# Patient Record
Sex: MALE | Race: WHITE | NOT HISPANIC OR LATINO | Employment: FULL TIME | URBAN - METROPOLITAN AREA
[De-identification: names, ages, dates, MRNs, and addresses within clinical notes are randomized per-mention and may not be internally consistent; named-entity substitution may affect disease eponyms.]

---

## 2017-01-16 DIAGNOSIS — I10 ESSENTIAL HYPERTENSION: ICD-10-CM

## 2017-01-16 RX ORDER — ATORVASTATIN CALCIUM 10 MG/1
10 TABLET, FILM COATED ORAL DAILY
Qty: 30 TAB | Refills: 3 | Status: SHIPPED | OUTPATIENT
Start: 2017-01-16 | End: 2017-01-19 | Stop reason: SDUPTHER

## 2017-01-16 RX ORDER — LISINOPRIL AND HYDROCHLOROTHIAZIDE 12.5; 1 MG/1; MG/1
1 TABLET ORAL DAILY
Qty: 30 TAB | Refills: 3 | Status: SHIPPED | OUTPATIENT
Start: 2017-01-16 | End: 2017-01-19 | Stop reason: SDUPTHER

## 2017-01-16 NOTE — TELEPHONE ENCOUNTER
Was the patient seen in the last year in this department? Yes     Does patient have an active prescription for medications requested? No     Received Request Via: Pharmacy   Last seen 12/15/16  Last labs   12/15/16

## 2017-01-19 ENCOUNTER — OFFICE VISIT (OUTPATIENT)
Dept: MEDICAL GROUP | Facility: PHYSICIAN GROUP | Age: 37
End: 2017-01-19
Payer: COMMERCIAL

## 2017-01-19 VITALS
RESPIRATION RATE: 16 BRPM | SYSTOLIC BLOOD PRESSURE: 138 MMHG | OXYGEN SATURATION: 93 % | BODY MASS INDEX: 38.19 KG/M2 | WEIGHT: 252 LBS | HEIGHT: 68 IN | DIASTOLIC BLOOD PRESSURE: 80 MMHG | HEART RATE: 105 BPM | TEMPERATURE: 97.7 F

## 2017-01-19 DIAGNOSIS — E78.2 MIXED HYPERLIPIDEMIA: ICD-10-CM

## 2017-01-19 DIAGNOSIS — I10 ESSENTIAL HYPERTENSION: ICD-10-CM

## 2017-01-19 DIAGNOSIS — E11.9 CONTROLLED TYPE 2 DIABETES MELLITUS WITHOUT COMPLICATION, WITHOUT LONG-TERM CURRENT USE OF INSULIN (HCC): ICD-10-CM

## 2017-01-19 PROCEDURE — 99214 OFFICE O/P EST MOD 30 MIN: CPT | Performed by: FAMILY MEDICINE

## 2017-01-19 RX ORDER — ATORVASTATIN CALCIUM 10 MG/1
10 TABLET, FILM COATED ORAL DAILY
Qty: 90 TAB | Refills: 1 | Status: SHIPPED | OUTPATIENT
Start: 2017-01-19 | End: 2017-09-18 | Stop reason: SDUPTHER

## 2017-01-19 RX ORDER — LISINOPRIL AND HYDROCHLOROTHIAZIDE 12.5; 1 MG/1; MG/1
1 TABLET ORAL DAILY
Qty: 90 TAB | Refills: 1 | Status: SHIPPED | OUTPATIENT
Start: 2017-01-19 | End: 2017-09-18 | Stop reason: SDUPTHER

## 2017-01-19 RX ORDER — PIOGLITAZONEHYDROCHLORIDE 30 MG/1
30 TABLET ORAL DAILY
Qty: 90 TAB | Refills: 1 | Status: SHIPPED | OUTPATIENT
Start: 2017-01-19 | End: 2019-04-30 | Stop reason: SDUPTHER

## 2017-01-19 ASSESSMENT — ENCOUNTER SYMPTOMS
MUSCULOSKELETAL NEGATIVE: 1
NECK PAIN: 0
CHILLS: 0
HEMOPTYSIS: 0
CONSTIPATION: 0
CONSTITUTIONAL NEGATIVE: 1
CARDIOVASCULAR NEGATIVE: 1
COUGH: 0
RESPIRATORY NEGATIVE: 1
DIZZINESS: 0
NEUROLOGICAL NEGATIVE: 1
PALPITATIONS: 0
FEVER: 0
PSYCHIATRIC NEGATIVE: 1
MYALGIAS: 0
HEADACHES: 0
EYES NEGATIVE: 1
GASTROINTESTINAL NEGATIVE: 1

## 2017-01-19 ASSESSMENT — PATIENT HEALTH QUESTIONNAIRE - PHQ9: CLINICAL INTERPRETATION OF PHQ2 SCORE: 0

## 2017-01-19 NOTE — MR AVS SNAPSHOT
"        Yannick López   2017 5:30 PM   Office Visit   MRN: [id number]    Department:  [facility]    Dept Phone:  [phone]    Description:  Male : 1980   Provider:  Murali Levine M.D.           Reason for Visit     Results Labs      Allergies as of 2017     No Known Allergies      You were diagnosed with     Controlled type 2 diabetes mellitus without complication, without long-term current use of insulin (CMS-HCC)   [8597898]       Essential hypertension   [1671985]       Mixed hyperlipidemia   [272.2.ICD-9-CM]         Vital Signs     Blood Pressure Pulse Temperature Respirations Height Weight    138/80 mmHg 105 36.5 °C (97.7 °F) 16 1.727 m (5' 8\") 114.306 kg (252 lb)    Body Mass Index Oxygen Saturation Smoking Status             38.33 kg/m2 93% Never Smoker          Basic Information     Date Of Birth Sex Race Ethnicity Preferred Language    1980 Male White Non- English      Problem List              ICD-10-CM Priority Class Noted - Resolved    Diabetes mellitus type II, controlled (CMS-HCC) E11.9   2015 - Present    Essential hypertension I10   2015 - Present    HLD (hyperlipidemia) E78.5   2015 - Present      Health Maintenance        Date Due Completion Dates    IMM HEP B VACCINE (1 of 3 - Primary Series) 1980 ---    RETINAL SCREENING 10/23/1997 ---    URINE ACR / MICROALBUMIN 10/23/1997 ---    IMM DTaP/Tdap/Td Vaccine (1 - Tdap) 10/23/1998 ---    IMM PNEUMOCOCCAL 19-64 (ADULT) MEDIUM RISK SERIES (1 of 1 - PPSV23) 10/23/1998 ---    IMM INFLUENZA (1) 2016 ---    FASTING LIPID PROFILE 2017, 2012    A1C SCREENING 6/15/2017 12/15/2016, 2016    SERUM CREATININE 12/15/2017 12/15/2016, 2016, 2012, 2009    DIABETES MONOFILAMENT / LE EXAM 2018 (Done)    Override on 2017: Done            Current Immunizations     No immunizations on file.      Below and/or attached are the medications your " provider expects you to take. Review all of your home medications and newly ordered medications with your provider and/or pharmacist. Follow medication instructions as directed by your provider and/or pharmacist. Please keep your medication list with you and share with your provider. Update the information when medications are discontinued, doses are changed, or new medications (including over-the-counter products) are added; and carry medication information at all times in the event of emergency situations     Allergies:  No Known Allergies          Medications  Valid as of: January 19, 2017 -  5:45 PM    Generic Name Brand Name Tablet Size Instructions for use    Atorvastatin Calcium (Tab) LIPITOR 10 MG Take 1 Tab by mouth every day.        Fluticasone Propionate (Suspension) FLONASE 50 MCG/ACT Spray 2 Sprays in nose every day. Each Nostril        Lisinopril-Hydrochlorothiazide (Tab) PRINZIDE, ZESTORETIC 10-12.5 MG Take 1 Tab by mouth every day.        MetFORMIN HCl (Tab) GLUCOPHAGE 1000 MG Take 1 Tab by mouth 2 times a day, with meals.        Omeprazole   Take  by mouth.        Pioglitazone HCl (Tab) ACTOS 30 MG Take 1 Tab by mouth every day.        Sildenafil Citrate (Tab) VIAGRA 100 MG Take 1 Tab by mouth as needed for Erectile Dysfunction.        .                 Medicines prescribed today were sent to:     [facility] #[id number] - [place] - [address] [place] [address]    Phone: [phone] Fax: [phone]    Open 24 Hours?: No      Medication refill instructions:       If your prescription bottle indicates you have medication refills left, it is not necessary to call your provider’s office. Please contact your pharmacy and they will refill your medication.    If your prescription bottle indicates you do not have any refills left, you may request refills at any time through one of the following ways: The online [employer] system (except Urgent Care), by calling your provider’s  office, or by asking your pharmacy to contact your provider’s office with a refill request. Medication refills are processed only during regular business hours and may not be available until the next business day. Your provider may request additional information or to have a follow-up visit with you prior to refilling your medication.   *Please Note: Medication refills are assigned a new Rx number when refilled electronically. Your pharmacy may indicate that no refills were authorized even though a new prescription for the same medication is available at the pharmacy. Please request the medicine by name with the pharmacy before contacting your provider for a refill.        Your To Do List     Future Labs/Procedures Complete By Expires    COMP METABOLIC PANEL  As directed 1/19/2018    HEMOGLOBIN A1C  As directed 1/19/2018    LIPID PROFILE  As directed 1/19/2018    MICROALBUMIN 24 HR URINE  As directed 1/19/2018      Referral     A referral request has been sent to our patient care coordination department. Please allow 3-5 business days for us to process this request and contact you either by phone or mail. If you do not hear from us by the 5th business day, please call us at [phone].           [employer] Access Code: Activation code not generated  Current [employer] Status: Active

## 2017-01-20 NOTE — PROGRESS NOTES
Subjective:      Yannick López is a 36 y.o. male who presents with Results            HPI Comments: 1. Controlled type 2 diabetes mellitus without complication, without long-term current use of insulin (CMS-[facility])  a1c better at 9 but still not ideal will add actos to regimen and repeat labs in 3 mo  - pioglitazone (ACTOS) 30 MG Tab; Take 1 Tab by mouth every day.  Dispense: 90 Tab; Refill: 1  - COMP METABOLIC PANEL; Future  - HEMOGLOBIN A1C; Future  - LIPID PROFILE; Future  - MICROALBUMIN 24 HR URINE; Future  - REFERRAL TO OPHTHALMOLOGY    2. Essential hypertension  Currently treated for HTN, taking meds with no CP or sob, monitors bp at home periodically. controlled      - pioglitazone (ACTOS) 30 MG Tab; Take 1 Tab by mouth every day.  Dispense: 90 Tab; Refill: 1  - COMP METABOLIC PANEL; Future  - HEMOGLOBIN A1C; Future  - LIPID PROFILE; Future  - MICROALBUMIN 24 HR URINE; Future  - REFERRAL TO OPHTHALMOLOGY    3. Mixed hyperlipidemia  Currently treated for HLD, taking meds with no new myalgias or joint pain, watching fats in diet  controlled      - pioglitazone (ACTOS) 30 MG Tab; Take 1 Tab by mouth every day.  Dispense: 90 Tab; Refill: 1  - COMP METABOLIC PANEL; Future  - HEMOGLOBIN A1C; Future  - LIPID PROFILE; Future  - MICROALBUMIN 24 HR URINE; Future  - REFERRAL TO OPHTHALMOLOGY    Past Medical History:    GERD (gastroesophageal reflux disease)                        Hyperlipidemia                                                Diabetes (CMS-[facility])                                              Comment:2 years ago was on meds, today nfbs 235  Past Surgical History:    OTHER ORTHOPEDIC SURGERY                                         Comment:left knee acl. 2000    ACL RECONSTRUCTION                              Left               Smoking Status: Never Smoker                      Smokeless Status: Current User                      Alcohol Use: Yes                Comment: occ    Review of patient's family history  indicates:    Heart Disease                  Father                    Hyperlipidemia                 Father                      Current outpatient prescriptions: •  pioglitazone (ACTOS) 30 MG Tab, Take 1 Tab by mouth every day., Disp: 90 Tab, Rfl: 1•  lisinopril-hydrochlorothiazide (PRINZIDE, ZESTORETIC) 10-12.5 MG per tablet, Take 1 Tab by mouth every day., Disp: 30 Tab, Rfl: 3•  atorvastatin (LIPITOR) 10 MG Tab, Take 1 Tab by mouth every day., Disp: 30 Tab, Rfl: 3•  metformin (GLUCOPHAGE) 1000 MG tablet, Take 1 Tab by mouth 2 times a day, with meals., Disp: 180 Tab, Rfl: 3•  sildenafil citrate (VIAGRA) 100 MG tablet, Take 1 Tab by mouth as needed for Erectile Dysfunction., Disp: 10 Tab, Rfl: 3•  Omeprazole (PRILOSEC PO), Take  by mouth., Disp: , Rfl: •  fluticasone (FLONASE) 50 MCG/ACT nasal spray, Spray 2 Sprays in nose every day. Each Nostril, Disp: 1 Bottle, Rfl: 0    Assessment/plan: diagnoses listed as above in problem list. Patient will continue with current medications/diet/lifestyle modifications    Patient will continue with current medication regimen, advised on taking meds every day, f/u in 3 mo.            Review of Systems   Constitutional: Negative.  Negative for fever and chills.        Past Medical History:    GERD (gastroesophageal reflux disease)                        Hyperlipidemia                                                Diabetes (CMS-HCC)                                              Comment:2 years ago was on meds, today nfbs 235  Past Surgical History:    OTHER ORTHOPEDIC SURGERY                                         Comment:left knee acl. 2000    ACL RECONSTRUCTION                              Left               Smoking Status: Never Smoker                      Smokeless Status: Current User                      Alcohol Use: Yes                Comment: occ    Review of patient's family history indicates:    Heart Disease                  Father                    Hyperlipidemia     "             Father                     HENT: Negative.    Eyes: Negative.    Respiratory: Negative.  Negative for cough and hemoptysis.    Cardiovascular: Negative.  Negative for chest pain and palpitations.   Gastrointestinal: Negative.  Negative for constipation.   Genitourinary: Negative.  Negative for dysuria and urgency.   Musculoskeletal: Negative.  Negative for myalgias and neck pain.   Skin: Negative.  Negative for rash.   Neurological: Negative.  Negative for dizziness and headaches.   Endo/Heme/Allergies: Negative.    Psychiatric/Behavioral: Negative.  Negative for suicidal ideas.          Objective:     /80 mmHg  Pulse 105  Temp(Src) 36.5 °C (97.7 °F)  Resp 16  Ht 1.727 m (5' 8\")  Wt 114.306 kg (252 lb)  BMI 38.33 kg/m2  SpO2 93%     Physical Exam   Constitutional: He is oriented to person, place, and time. No distress.   HENT:   Head: Normocephalic and atraumatic.   Right Ear: External ear normal.   Left Ear: External ear normal.   Nose: Nose normal.   Mouth/Throat: Oropharynx is clear and moist. No oropharyngeal exudate.   Eyes: Pupils are equal, round, and reactive to light. Right eye exhibits no discharge. Left eye exhibits no discharge. No scleral icterus.   Neck: Normal range of motion. Neck supple. No JVD present. No tracheal deviation present. No thyromegaly present.   Cardiovascular: Normal rate, regular rhythm, normal heart sounds and intact distal pulses.  Exam reveals no gallop and no friction rub.    No murmur heard.  Pulmonary/Chest: Effort normal and breath sounds normal. No stridor. No respiratory distress. He has no wheezes. He has no rales. He exhibits no tenderness.   Abdominal: Soft. He exhibits no distension. There is no tenderness.   Lymphadenopathy:     He has no cervical adenopathy.   Neurological: He is alert and oriented to person, place, and time.   Skin: Skin is warm and dry. He is not diaphoretic.   Psychiatric: Judgment normal.   Nursing note and vitals " reviewed.              Assessment/Plan:     1. Controlled type 2 diabetes mellitus without complication, without long-term current use of insulin (CMS-HCC)    - pioglitazone (ACTOS) 30 MG Tab; Take 1 Tab by mouth every day.  Dispense: 90 Tab; Refill: 1  - COMP METABOLIC PANEL; Future  - HEMOGLOBIN A1C; Future  - LIPID PROFILE; Future  - MICROALBUMIN 24 HR URINE; Future  - REFERRAL TO OPHTHALMOLOGY    2. Essential hypertension    - pioglitazone (ACTOS) 30 MG Tab; Take 1 Tab by mouth every day.  Dispense: 90 Tab; Refill: 1  - COMP METABOLIC PANEL; Future  - HEMOGLOBIN A1C; Future  - LIPID PROFILE; Future  - MICROALBUMIN 24 HR URINE; Future  - REFERRAL TO OPHTHALMOLOGY    3. Mixed hyperlipidemia  - pioglitazone (ACTOS) 30 MG Tab; Take 1 Tab by mouth every day.  Dispense: 90 Tab; Refill: 1  - COMP METABOLIC PANEL; Future  - HEMOGLOBIN A1C; Future  - LIPID PROFILE; Future  - MICROALBUMIN 24 HR URINE; Future  - REFERRAL TO OPHTHALMOLOGY

## 2017-04-27 ENCOUNTER — APPOINTMENT (OUTPATIENT)
Dept: MEDICAL GROUP | Facility: PHYSICIAN GROUP | Age: 37
End: 2017-04-27
Payer: COMMERCIAL

## 2017-09-18 DIAGNOSIS — I10 ESSENTIAL HYPERTENSION: ICD-10-CM

## 2017-09-18 RX ORDER — ATORVASTATIN CALCIUM 10 MG/1
10 TABLET, FILM COATED ORAL DAILY
Qty: 90 TAB | Refills: 0 | Status: SHIPPED | OUTPATIENT
Start: 2017-09-18 | End: 2017-12-18 | Stop reason: SDUPTHER

## 2017-09-18 RX ORDER — LISINOPRIL AND HYDROCHLOROTHIAZIDE 12.5; 1 MG/1; MG/1
1 TABLET ORAL DAILY
Qty: 90 TAB | Refills: 0 | Status: SHIPPED | OUTPATIENT
Start: 2017-09-18 | End: 2017-12-18 | Stop reason: SDUPTHER

## 2017-09-18 NOTE — TELEPHONE ENCOUNTER
Was the patient seen in the last year in this department? Yes     Does patient have an active prescription for medications requested? No     Received Request Via: Pharmacy   Last seen  1/19/17  Last labs 12/15/16

## 2017-12-18 DIAGNOSIS — I10 ESSENTIAL HYPERTENSION: ICD-10-CM

## 2017-12-18 RX ORDER — ATORVASTATIN CALCIUM 10 MG/1
10 TABLET, FILM COATED ORAL DAILY
Qty: 90 TAB | Refills: 0 | Status: SHIPPED | OUTPATIENT
Start: 2017-12-18 | End: 2018-03-20 | Stop reason: SDUPTHER

## 2017-12-18 RX ORDER — LISINOPRIL AND HYDROCHLOROTHIAZIDE 12.5; 1 MG/1; MG/1
1 TABLET ORAL DAILY
Qty: 90 TAB | Refills: 0 | Status: SHIPPED | OUTPATIENT
Start: 2017-12-18 | End: 2018-03-20 | Stop reason: SDUPTHER

## 2017-12-18 NOTE — TELEPHONE ENCOUNTER
Was the patient seen in the last year in this department? Yes     Does patient have an active prescription for medications requested? Yes     Received Request Via: Pharmacy     Last Visit: 1/19/17  Last Labs: 12/15/16

## 2018-03-20 DIAGNOSIS — I10 ESSENTIAL HYPERTENSION: ICD-10-CM

## 2018-03-20 RX ORDER — LISINOPRIL AND HYDROCHLOROTHIAZIDE 12.5; 1 MG/1; MG/1
1 TABLET ORAL DAILY
Qty: 90 TAB | Refills: 0 | Status: SHIPPED | OUTPATIENT
Start: 2018-03-20 | End: 2021-03-13

## 2018-03-20 RX ORDER — ATORVASTATIN CALCIUM 10 MG/1
10 TABLET, FILM COATED ORAL DAILY
Qty: 90 TAB | Refills: 0 | Status: SHIPPED | OUTPATIENT
Start: 2018-03-20 | End: 2019-04-30 | Stop reason: SDUPTHER

## 2018-03-20 NOTE — TELEPHONE ENCOUNTER
Was the patient seen in the last year in this department? No     Does patient have an active prescription for medications requested? No     Received Request Via: Pharmacy     Last Visit: 01/19/17  Last Labs: 12/15/16  Next Appt: N/A

## 2018-06-08 ENCOUNTER — HOSPITAL ENCOUNTER (OUTPATIENT)
Facility: MEDICAL CENTER | Age: 38
End: 2018-06-08
Attending: PHYSICIAN ASSISTANT
Payer: COMMERCIAL

## 2018-06-08 ENCOUNTER — OFFICE VISIT (OUTPATIENT)
Dept: URGENT CARE | Facility: PHYSICIAN GROUP | Age: 38
End: 2018-06-08
Payer: COMMERCIAL

## 2018-06-08 VITALS
DIASTOLIC BLOOD PRESSURE: 80 MMHG | HEART RATE: 90 BPM | WEIGHT: 235 LBS | BODY MASS INDEX: 35.61 KG/M2 | SYSTOLIC BLOOD PRESSURE: 124 MMHG | TEMPERATURE: 97.8 F | HEIGHT: 68 IN | OXYGEN SATURATION: 97 %

## 2018-06-08 DIAGNOSIS — R68.89 FLU-LIKE SYMPTOMS: ICD-10-CM

## 2018-06-08 DIAGNOSIS — H10.33 ACUTE BACTERIAL CONJUNCTIVITIS OF BOTH EYES: ICD-10-CM

## 2018-06-08 PROCEDURE — 99204 OFFICE O/P NEW MOD 45 MIN: CPT | Performed by: PHYSICIAN ASSISTANT

## 2018-06-08 RX ORDER — MOXIFLOXACIN 5 MG/ML
1 SOLUTION/ DROPS OPHTHALMIC 3 TIMES DAILY
Qty: 1 BOTTLE | Refills: 0 | Status: SHIPPED | OUTPATIENT
Start: 2018-06-08 | End: 2018-06-15

## 2018-06-08 ASSESSMENT — ENCOUNTER SYMPTOMS
PHOTOPHOBIA: 0
SORE THROAT: 0
PALPITATIONS: 0
CHILLS: 0
MYALGIAS: 1
DOUBLE VISION: 0
FEVER: 1
SHORTNESS OF BREATH: 0
EYE REDNESS: 1
BLURRED VISION: 0
HEADACHES: 0
EYE PAIN: 1
SINUS PAIN: 0
COUGH: 0
EYE DISCHARGE: 1

## 2018-06-08 NOTE — PROGRESS NOTES
Subjective:      Yannick López is a 37 y.o. male who presents with Fever (red [id number] ; got back from trip from [facility])            Fever    This is a new problem. The current episode started in the past 7 days. The problem occurs intermittently. The problem has been unchanged. Pertinent negatives include no chest pain, congestion, coughing, ear pain, headaches, rash or sore throat. Associated symptoms comments: conjunctivitis  .   Risk factors: recent travel        Review of Systems   Constitutional: Positive for fever and malaise/fatigue. Negative for chills.   HENT: Negative for congestion, ear pain, sinus pain and sore throat.    Eyes: Positive for pain, discharge and redness. Negative for blurred vision, double vision and photophobia.   Respiratory: Negative for cough and shortness of breath.    Cardiovascular: Negative for chest pain and palpitations.   Musculoskeletal: Positive for myalgias.   Skin: Negative for rash.   Neurological: Negative for headaches.   All other systems reviewed and are negative.    PMH:  has a past medical history of Diabetes (HCC); GERD (gastroesophageal reflux disease); and Hyperlipidemia.  MEDS:   Current Outpatient Prescriptions:   •  moxifloxacin (VIGAMOX) 0.5 % Solution, Place 1 Drop in both eyes 3 times a day for 7 days., Disp: 1 Bottle, Rfl: 0  •  atorvastatin (LIPITOR) 10 MG Tab, TAKE 1 TAB BY MOUTH EVERY DAY., Disp: 90 Tab, Rfl: 0  •  metformin (GLUCOPHAGE) 1000 MG tablet, Take 1 Tab by mouth 2 times a day, with meals., Disp: 180 Tab, Rfl: 3  •  lisinopril-hydrochlorothiazide (PRINZIDE, ZESTORETIC) 10-12.5 MG per tablet, TAKE 1 TAB BY MOUTH EVERY DAY., Disp: 90 Tab, Rfl: 0  •  pioglitazone (ACTOS) 30 MG Tab, Take 1 Tab by mouth every day., Disp: 90 Tab, Rfl: 1  •  sildenafil citrate (VIAGRA) 100 MG tablet, Take 1 Tab by mouth as needed for Erectile Dysfunction., Disp: 10 Tab, Rfl: 3  •  Omeprazole (PRILOSEC PO), Take  by mouth., Disp: , Rfl:   •  fluticasone (FLONASE)  "50 MCG/ACT nasal spray, Spray 2 Sprays in nose every day. Each Nostril, Disp: 1 Bottle, Rfl: 0  ALLERGIES: No Known Allergies  SURGHX:   Past Surgical History:   Procedure Laterality Date   • ACL RECONSTRUCTION Left    • OTHER ORTHOPEDIC SURGERY      left knee acl. 2000     SOCHX:  reports that he has never smoked. He uses smokeless tobacco. He reports that he drinks alcohol. He reports that he does not use drugs.  FH: Family history was reviewed, no pertinent findings to report  Medications, Allergies, and current problem list reviewed today in Epic         Objective:     /80   Pulse 90   Temp 36.6 °C (97.8 °F)   Ht 1.727 m (5' 8\")   Wt 106.6 kg (235 lb)   SpO2 97%   BMI 35.73 kg/m²      Physical Exam   Constitutional: He is oriented to person, place, and time. He appears well-developed and well-nourished. He is active.  Non-toxic appearance. He does not have a sickly appearance. He does not appear ill. No distress.   HENT:   Head: Normocephalic and atraumatic.   Right Ear: Hearing, tympanic membrane, external ear and ear canal normal.   Left Ear: Hearing, tympanic membrane, external ear and ear canal normal.   Nose: Nose normal.   Mouth/Throat: Uvula is midline, oropharynx is clear and moist and mucous membranes are normal. No oropharyngeal exudate.   Eyes: EOM and lids are normal. Pupils are equal, round, and reactive to light. Lids are everted and swept, no foreign bodies found. Right eye exhibits exudate. Left eye exhibits exudate. Right conjunctiva is injected. Left conjunctiva is injected.   Neck: Normal range of motion and full passive range of motion without pain. Neck supple.   Cardiovascular: Normal rate, regular rhythm and normal heart sounds.  Exam reveals no gallop and no friction rub.    No murmur heard.  Pulmonary/Chest: Effort normal and breath sounds normal. No respiratory distress. He has no decreased breath sounds. He has no wheezes. He has no rales. He exhibits no tenderness. "   Musculoskeletal: Normal range of motion. He exhibits no edema, tenderness or deformity.   Neurological: He is alert and oriented to person, place, and time.   Skin: Skin is warm, dry and intact.   Psychiatric: He has a normal mood and affect. His speech is normal and behavior is normal. Judgment and thought content normal.   Vitals reviewed.              Assessment/Plan:   Pt is a 37 yr old male who recently returned from [place] with flu like symptoms and conjunctivitis.  His wife is currently pregnant.  He has concerns that he may have contracted the Zika virus.  Discussed that treatment will be same regardless of testing and that there are a high number of false negative/positive tests.  Pt still requests testing.  Recommended not to have intercourse or use condom for 6 months.  Trial of antibiotic drops for eyes.  1. Flu-like symptoms  moxifloxacin (VIGAMOX) 0.5 % Solution    ZIKA VIRUS BY PCR, URINE   2. Acute bacterial conjunctivitis of both eyes  moxifloxacin (VIGAMOX) 0.5 % Solution    ZIKA VIRUS BY PCR, URINE     Differential diagnosis, natural history, supportive care discussed. Follow-up with primary care provider within 7-10 days, emergency room precautions discussed.  Patient and/or family appears understanding of information.  Handout and review of patients diagnosis and treatment was discussed extensively.

## 2018-06-09 DIAGNOSIS — H10.33 ACUTE BACTERIAL CONJUNCTIVITIS OF BOTH EYES: ICD-10-CM

## 2018-06-09 DIAGNOSIS — R68.89 FLU-LIKE SYMPTOMS: ICD-10-CM

## 2019-04-30 ENCOUNTER — OFFICE VISIT (OUTPATIENT)
Dept: MEDICAL GROUP | Facility: PHYSICIAN GROUP | Age: 39
End: 2019-04-30
Payer: COMMERCIAL

## 2019-04-30 VITALS
OXYGEN SATURATION: 93 % | RESPIRATION RATE: 12 BRPM | SYSTOLIC BLOOD PRESSURE: 108 MMHG | WEIGHT: 232 LBS | TEMPERATURE: 97.5 F | BODY MASS INDEX: 35.16 KG/M2 | HEART RATE: 87 BPM | DIASTOLIC BLOOD PRESSURE: 70 MMHG | HEIGHT: 68 IN

## 2019-04-30 DIAGNOSIS — I10 ESSENTIAL HYPERTENSION: ICD-10-CM

## 2019-04-30 DIAGNOSIS — E66.9 OBESITY (BMI 35.0-39.9 WITHOUT COMORBIDITY): ICD-10-CM

## 2019-04-30 DIAGNOSIS — E11.9 DIABETES MELLITUS WITHOUT COMPLICATION (HCC): ICD-10-CM

## 2019-04-30 DIAGNOSIS — Z91.199 MEDICAL NON-COMPLIANCE: ICD-10-CM

## 2019-04-30 DIAGNOSIS — E78.2 MIXED HYPERLIPIDEMIA: ICD-10-CM

## 2019-04-30 PROCEDURE — 92250 FUNDUS PHOTOGRAPHY W/I&R: CPT | Mod: TC | Performed by: FAMILY MEDICINE

## 2019-04-30 PROCEDURE — 99214 OFFICE O/P EST MOD 30 MIN: CPT | Performed by: FAMILY MEDICINE

## 2019-04-30 RX ORDER — PIOGLITAZONEHYDROCHLORIDE 30 MG/1
30 TABLET ORAL DAILY
Qty: 90 TAB | Refills: 1 | Status: SHIPPED | OUTPATIENT
Start: 2019-04-30 | End: 2019-10-29 | Stop reason: SDUPTHER

## 2019-04-30 RX ORDER — ATORVASTATIN CALCIUM 10 MG/1
10 TABLET, FILM COATED ORAL DAILY
Qty: 90 TAB | Refills: 0 | Status: SHIPPED | OUTPATIENT
Start: 2019-04-30 | End: 2019-07-24 | Stop reason: SDUPTHER

## 2019-04-30 ASSESSMENT — ENCOUNTER SYMPTOMS
COUGH: 0
BRUISES/BLEEDS EASILY: 0
DIZZINESS: 0
MUSCULOSKELETAL NEGATIVE: 1
FEVER: 0
HEARTBURN: 0
RESPIRATORY NEGATIVE: 1
TINGLING: 0
HEADACHES: 0
BLURRED VISION: 0
NAUSEA: 0
MYALGIAS: 0
NEUROLOGICAL NEGATIVE: 1
DEPRESSION: 0
HEMOPTYSIS: 0
CONSTITUTIONAL NEGATIVE: 1
DOUBLE VISION: 0
CARDIOVASCULAR NEGATIVE: 1
CHILLS: 0
EYES NEGATIVE: 1
PALPITATIONS: 0
GASTROINTESTINAL NEGATIVE: 1
PSYCHIATRIC NEGATIVE: 1

## 2019-04-30 ASSESSMENT — PATIENT HEALTH QUESTIONNAIRE - PHQ9: CLINICAL INTERPRETATION OF PHQ2 SCORE: 0

## 2019-04-30 NOTE — PROGRESS NOTES
Subjective:      Yannick López is a 38 y.o. male who presents with Diabetes and Sinusitis            1. Diabetes mellitus without complication (HCC)  Ran out of meds 2 months ago and did not refill due to non -compliance and thinking that medical issue may go away  a1c today of 11  Will get back on meds and then f/u with previsit labs in 3 mo  - POCT A1C  - Patient identified as having weight management issue.  Appropriate orders and counseling given.  - POCT Retinal Eye Exam  - Comp Metabolic Panel; Future  - HEMOGLOBIN A1C; Future  - Lipid Profile; Future  - MICROALBUMIN CREAT RATIO URINE (LAB COLLECT); Future  - pioglitazone (ACTOS) 30 MG Tab; Take 1 Tab by mouth every day.  Dispense: 90 Tab; Refill: 1  - metformin (GLUCOPHAGE) 1000 MG tablet; Take 1 Tab by mouth 2 times a day, with meals.  Dispense: 180 Tab; Refill: 3    2. Obesity (BMI 35.0-39.9 without comorbidity)  Patient has a diagnosis of obesity. They were counseled today on increasing exercise and  extensively counseled on a low carb diet     - POCT A1C  - Patient identified as having weight management issue.  Appropriate orders and counseling given.  - POCT Retinal Eye Exam  - Comp Metabolic Panel; Future  - HEMOGLOBIN A1C; Future  - Lipid Profile; Future  - MICROALBUMIN CREAT RATIO URINE (LAB COLLECT); Future    3. Medical non-compliance      4. Essential hypertension  Currently treated for HTN, taking meds with no CP or sob, monitors bp at home periodically. controlled      5. Mixed hyperlipidemia  Needs new labs after on for 3 mo  - atorvastatin (LIPITOR) 10 MG Tab; Take 1 Tab by mouth every day.  Dispense: 90 Tab; Refill: 0    Past Medical History:  No date: Diabetes (HCC)      Comment:  2 years ago was on meds, today nfbs 235  No date: GERD (gastroesophageal reflux disease)  No date: Hyperlipidemia  Past Surgical History:  No date: ACL RECONSTRUCTION; Left  No date: OTHER ORTHOPEDIC SURGERY      Comment:  left knee acl. 2000  Smoking status: Never  Smoker                                                              Smokeless tobacco: Current User                    Alcohol use: Yes              Comment: occ    Review of patient's family history indicates:  Problem: Heart Disease      Relation: Father       Age of Onset: (Not Specified)   Problem: Hyperlipidemia      Relation: Father       Age of Onset: (Not Specified)       Current Outpatient Prescriptions: •  atorvastatin (LIPITOR) 10 MG Tab, Take 1 Tab by mouth every day., Disp: 90 Tab, Rfl: 0•  pioglitazone (ACTOS) 30 MG Tab, Take 1 Tab by mouth every day., Disp: 90 Tab, Rfl: 1•  metformin (GLUCOPHAGE) 1000 MG tablet, Take 1 Tab by mouth 2 times a day, with meals., Disp: 180 Tab, Rfl: 3•  lisinopril-hydrochlorothiazide (PRINZIDE, ZESTORETIC) 10-12.5 MG per tablet, TAKE 1 TAB BY MOUTH EVERY DAY. (Patient not taking: Reported on 4/30/2019), Disp: 90 Tab, Rfl: 0•  sildenafil citrate (VIAGRA) 100 MG tablet, Take 1 Tab by mouth as needed for Erectile Dysfunction. (Patient not taking: Reported on 4/30/2019), Disp: 10 Tab, Rfl: 3•  Omeprazole (PRILOSEC PO), Take  by mouth., Disp: , Rfl: •  fluticasone (FLONASE) 50 MCG/ACT nasal spray, Spray 2 Sprays in nose every day. Each Nostril (Patient not taking: Reported on 4/30/2019), Disp: 1 Bottle, Rfl: 0    Patient was instructed on the use of medications, either prescriptions or OTC and informed on when the appropriate follow up time period should be. In addition, patient was also instructed that should any acute worsening occur that they should notify this clinic asap or call 911.            Review of Systems   Constitutional: Negative.  Negative for chills and fever.   HENT: Negative.  Negative for hearing loss.    Eyes: Negative.  Negative for blurred vision and double vision.   Respiratory: Negative.  Negative for cough and hemoptysis.    Cardiovascular: Negative.  Negative for chest pain and palpitations.   Gastrointestinal: Negative.  Negative for heartburn and  "nausea.   Genitourinary: Negative.  Negative for dysuria.   Musculoskeletal: Negative.  Negative for myalgias.   Skin: Negative.  Negative for rash.   Neurological: Negative.  Negative for dizziness, tingling and headaches.   Endo/Heme/Allergies: Negative.  Does not bruise/bleed easily.   Psychiatric/Behavioral: Negative.  Negative for depression and suicidal ideas.   All other systems reviewed and are negative.         Objective:     /70   Pulse 87   Temp 36.4 °C (97.5 °F)   Resp 12   Ht 1.727 m (5' 8\")   Wt 105.2 kg (232 lb)   SpO2 93%   BMI 35.28 kg/m²      Physical Exam   Constitutional: He is oriented to person, place, and time. He appears well-developed and well-nourished. No distress.   HENT:   Head: Normocephalic and atraumatic.   Mouth/Throat: Oropharynx is clear and moist. No oropharyngeal exudate.   Eyes: Pupils are equal, round, and reactive to light.   Cardiovascular: Normal rate, regular rhythm, normal heart sounds and intact distal pulses.  Exam reveals no gallop and no friction rub.    No murmur heard.  Pulmonary/Chest: Effort normal and breath sounds normal. No respiratory distress. He has no wheezes. He has no rales. He exhibits no tenderness.   Neurological: He is alert and oriented to person, place, and time.   Skin: He is not diaphoretic.   Feet clean and dry with good sensation on monofilament exam today     Psychiatric: He has a normal mood and affect. His behavior is normal. Judgment and thought content normal.   Nursing note and vitals reviewed.              Assessment/Plan:     1. Diabetes mellitus without complication (HCC)    - POCT A1C  - Patient identified as having weight management issue.  Appropriate orders and counseling given.  - POCT Retinal Eye Exam  - Comp Metabolic Panel; Future  - HEMOGLOBIN A1C; Future  - Lipid Profile; Future  - MICROALBUMIN CREAT RATIO URINE (LAB COLLECT); Future  - pioglitazone (ACTOS) 30 MG Tab; Take 1 Tab by mouth every day.  Dispense: 90 " Tab; Refill: 1  - metformin (GLUCOPHAGE) 1000 MG tablet; Take 1 Tab by mouth 2 times a day, with meals.  Dispense: 180 Tab; Refill: 3    2. Obesity (BMI 35.0-39.9 without comorbidity)    - POCT A1C  - Patient identified as having weight management issue.  Appropriate orders and counseling given.  - POCT Retinal Eye Exam  - Comp Metabolic Panel; Future  - HEMOGLOBIN A1C; Future  - Lipid Profile; Future  - MICROALBUMIN CREAT RATIO URINE (LAB COLLECT); Future    3. Medical non-compliance      4. Essential hypertension      5. Mixed hyperlipidemia  - atorvastatin (LIPITOR) 10 MG Tab; Take 1 Tab by mouth every day.  Dispense: 90 Tab; Refill: 0

## 2019-05-22 LAB — RETINAL SCREEN: NEGATIVE

## 2019-07-24 DIAGNOSIS — E78.2 MIXED HYPERLIPIDEMIA: ICD-10-CM

## 2019-07-24 RX ORDER — ATORVASTATIN CALCIUM 10 MG/1
TABLET, FILM COATED ORAL
Qty: 90 TAB | Refills: 0 | Status: SHIPPED | OUTPATIENT
Start: 2019-07-24 | End: 2019-10-26 | Stop reason: SDUPTHER

## 2019-07-24 NOTE — TELEPHONE ENCOUNTER
Was the patient seen in the last year in this department? Yes    Does patient have an active prescription for medications requested? Yes    Received Request Via: Pharmacy     Last visit:04/30/2019  Last Lab:12/15/2016

## 2019-10-02 ENCOUNTER — PATIENT MESSAGE (OUTPATIENT)
Dept: HEALTH INFORMATION MANAGEMENT | Facility: OTHER | Age: 39
End: 2019-10-02

## 2019-10-26 DIAGNOSIS — E78.2 MIXED HYPERLIPIDEMIA: ICD-10-CM

## 2019-10-28 RX ORDER — ATORVASTATIN CALCIUM 10 MG/1
TABLET, FILM COATED ORAL
Qty: 90 TAB | Refills: 0 | Status: SHIPPED | OUTPATIENT
Start: 2019-10-28 | End: 2020-03-16 | Stop reason: SDUPTHER

## 2019-10-29 DIAGNOSIS — E11.9 DIABETES MELLITUS WITHOUT COMPLICATION (HCC): ICD-10-CM

## 2019-10-29 RX ORDER — PIOGLITAZONEHYDROCHLORIDE 30 MG/1
TABLET ORAL
Qty: 90 TAB | Refills: 0 | Status: SHIPPED | OUTPATIENT
Start: 2019-10-29 | End: 2020-03-16 | Stop reason: SDUPTHER

## 2020-01-10 DIAGNOSIS — E78.2 MIXED HYPERLIPIDEMIA: ICD-10-CM

## 2020-01-10 DIAGNOSIS — E11.9 DIABETES MELLITUS WITHOUT COMPLICATION (HCC): ICD-10-CM

## 2020-01-10 DIAGNOSIS — N52.9 ERECTILE DYSFUNCTION, UNSPECIFIED ERECTILE DYSFUNCTION TYPE: ICD-10-CM

## 2020-01-10 NOTE — TELEPHONE ENCOUNTER
Was the patient seen in the last year in this department? Yes    Does patient have an active prescription for medications requested? Yes    Received Request Via: Pharmacy     Last ivist 4/30/2019  Last labs 12/15/2016

## 2020-01-13 RX ORDER — ATORVASTATIN CALCIUM 10 MG/1
10 TABLET, FILM COATED ORAL
Qty: 90 TAB | Refills: 0 | OUTPATIENT
Start: 2020-01-13

## 2020-01-13 RX ORDER — SILDENAFIL 100 MG/1
100 TABLET, FILM COATED ORAL PRN
Qty: 10 TAB | Refills: 3 | OUTPATIENT
Start: 2020-01-13

## 2020-03-12 LAB
ALBUMIN SERPL-MCNC: 4.4 G/DL (ref 4–5)
ALBUMIN/CREAT UR: 79 MG/G CREAT (ref 0–29)
ALBUMIN/GLOB SERPL: 1.7 {RATIO} (ref 1.2–2.2)
ALP SERPL-CCNC: 90 IU/L (ref 39–117)
ALT SERPL-CCNC: 40 IU/L (ref 0–44)
AST SERPL-CCNC: 21 IU/L (ref 0–40)
BILIRUB SERPL-MCNC: 0.4 MG/DL (ref 0–1.2)
BUN SERPL-MCNC: 12 MG/DL (ref 6–24)
BUN/CREAT SERPL: 12 (ref 9–20)
CALCIUM SERPL-MCNC: 9.8 MG/DL (ref 8.7–10.2)
CHLORIDE SERPL-SCNC: 97 MMOL/L (ref 96–106)
CHOLEST SERPL-MCNC: 244 MG/DL (ref 100–199)
CO2 SERPL-SCNC: 21 MMOL/L (ref 20–29)
CREAT SERPL-MCNC: 1.04 MG/DL (ref 0.76–1.27)
CREAT UR-MCNC: 76.8 MG/DL
GLOBULIN SER CALC-MCNC: 2.6 G/DL (ref 1.5–4.5)
GLUCOSE SERPL-MCNC: 261 MG/DL (ref 65–99)
HBA1C MFR BLD: 9.3 % (ref 4.8–5.6)
HDLC SERPL-MCNC: 33 MG/DL
LABORATORY COMMENT REPORT: ABNORMAL
LDLC SERPL CALC-MCNC: ABNORMAL MG/DL (ref 0–99)
MICROALBUMIN UR-MCNC: 60.8 UG/ML
POTASSIUM SERPL-SCNC: 4.9 MMOL/L (ref 3.5–5.2)
PROT SERPL-MCNC: 7 G/DL (ref 6–8.5)
SODIUM SERPL-SCNC: 135 MMOL/L (ref 134–144)
TRIGL SERPL-MCNC: 605 MG/DL (ref 0–149)
VLDLC SERPL CALC-MCNC: ABNORMAL MG/DL (ref 5–40)

## 2020-03-16 DIAGNOSIS — E11.9 DIABETES MELLITUS WITHOUT COMPLICATION (HCC): ICD-10-CM

## 2020-03-16 DIAGNOSIS — E78.2 MIXED HYPERLIPIDEMIA: ICD-10-CM

## 2020-03-16 RX ORDER — ATORVASTATIN CALCIUM 10 MG/1
10 TABLET, FILM COATED ORAL
Qty: 30 TAB | Refills: 0 | Status: SHIPPED | OUTPATIENT
Start: 2020-03-16 | End: 2020-04-14 | Stop reason: SDUPTHER

## 2020-03-16 RX ORDER — OMEPRAZOLE 20 MG/1
20 CAPSULE, DELAYED RELEASE ORAL DAILY
Qty: 30 CAP | Refills: 0 | Status: SHIPPED | OUTPATIENT
Start: 2020-03-16 | End: 2020-04-07

## 2020-03-16 RX ORDER — PIOGLITAZONEHYDROCHLORIDE 30 MG/1
30 TABLET ORAL
Qty: 30 TAB | Refills: 0 | Status: SHIPPED | OUTPATIENT
Start: 2020-03-16 | End: 2020-03-24 | Stop reason: SDUPTHER

## 2020-03-16 NOTE — TELEPHONE ENCOUNTER
Received request via: Patient    Was the patient seen in the last year in this department? No     Does the patient have an active prescription (recently filled or refills available) for medication(s) requested? No     Pt has upcoming appt. You approved 30 day fill.

## 2020-03-16 NOTE — TELEPHONE ENCOUNTER
Regarding: FW: Prescription Question  Contact: [phone]  Put him in for a one month refill  ----- Message -----  From: [facility]  Sent: 3/13/2020   3:42 PM PDT  To: Murali Levine M.D.  Subject: Prescription Question                            ----- Message from [facility] sent at 3/13/2020  3:42 PM PDT -----       ----- Message from Yannick López to Murali Levine M.D. sent at 3/13/2020 10:49 AM -----   I have scheduled a appt for he soonest available date on the 24th of March.  I'm currently out of all of my RX including metformin.  Can you please approve with Pharmacy so I can  RX

## 2020-03-24 ENCOUNTER — OFFICE VISIT (OUTPATIENT)
Dept: MEDICAL GROUP | Facility: PHYSICIAN GROUP | Age: 40
End: 2020-03-24
Payer: COMMERCIAL

## 2020-03-24 VITALS
RESPIRATION RATE: 16 BRPM | WEIGHT: 248 LBS | SYSTOLIC BLOOD PRESSURE: 140 MMHG | BODY MASS INDEX: 38.92 KG/M2 | HEART RATE: 92 BPM | HEIGHT: 67 IN | OXYGEN SATURATION: 94 % | DIASTOLIC BLOOD PRESSURE: 100 MMHG | TEMPERATURE: 97.3 F

## 2020-03-24 DIAGNOSIS — E78.2 MIXED HYPERLIPIDEMIA: ICD-10-CM

## 2020-03-24 DIAGNOSIS — E11.9 DIABETES MELLITUS WITHOUT COMPLICATION (HCC): ICD-10-CM

## 2020-03-24 DIAGNOSIS — I10 ESSENTIAL HYPERTENSION: ICD-10-CM

## 2020-03-24 PROCEDURE — 99214 OFFICE O/P EST MOD 30 MIN: CPT | Performed by: FAMILY MEDICINE

## 2020-03-24 RX ORDER — PIOGLITAZONEHYDROCHLORIDE 45 MG/1
45 TABLET ORAL
Qty: 30 TAB | Refills: 3 | Status: SHIPPED | OUTPATIENT
Start: 2020-03-24 | End: 2020-04-14 | Stop reason: SDUPTHER

## 2020-03-24 ASSESSMENT — ENCOUNTER SYMPTOMS
BRUISES/BLEEDS EASILY: 0
HEMOPTYSIS: 0
PSYCHIATRIC NEGATIVE: 1
TINGLING: 0
DEPRESSION: 0
COUGH: 0
HEARTBURN: 0
FEVER: 0
CONSTITUTIONAL NEGATIVE: 1
BLURRED VISION: 0
CHILLS: 0
DIZZINESS: 0
NEUROLOGICAL NEGATIVE: 1
NAUSEA: 0
PALPITATIONS: 0
MYALGIAS: 0
GASTROINTESTINAL NEGATIVE: 1
CARDIOVASCULAR NEGATIVE: 1
RESPIRATORY NEGATIVE: 1
DOUBLE VISION: 0
MUSCULOSKELETAL NEGATIVE: 1
HEADACHES: 0
EYES NEGATIVE: 1

## 2020-03-24 NOTE — PROGRESS NOTES
Subjective:      Yannick López is a 39 y.o. male who presents with Follow-Up (labs)            1. Diabetes mellitus without complication (HCC)  a1c suboptimal at 9,3  Will increase actos to 45 mg and f/u poct in 3 mo and work on diet  - pioglitazone (ACTOS) 45 MG Tab; Take 1 Tab by mouth every day.  Dispense: 30 Tab; Refill: 3    2. Mixed hyperlipidemia  Still with high trig  Will optimal ize a1c before adding anything to statin     3. Essential hypertension  Currently treated for HTN, taking meds with no CP or sob, monitors bp at home periodically. controlled      Past Medical History:  No date: Diabetes (HCC)      Comment:  2 years ago was on meds, today nfbs 235  No date: GERD (gastroesophageal reflux disease)  No date: Hyperlipidemia  Past Surgical History:  No date: ACL RECONSTRUCTION; Left  No date: OTHER ORTHOPEDIC SURGERY      Comment:  left knee acl. 2000  Social History    Tobacco Use      Smoking status: Never Smoker      Smokeless tobacco: Current User    Alcohol use: Yes      Comment: occ    Drug use: No    Review of patient's family history indicates:  Problem: Heart Disease      Relation: Father          Age of Onset: (Not Specified)  Problem: Hyperlipidemia      Relation: Father          Age of Onset: (Not Specified)      Current Outpatient Medications: •  pioglitazone (ACTOS) 45 MG Tab, Take 1 Tab by mouth every day., Disp: 30 Tab, Rfl: 3•  atorvastatin (LIPITOR) 10 MG Tab, Take 1 Tab by mouth every day., Disp: 30 Tab, Rfl: 0•  metformin (GLUCOPHAGE) 1000 MG tablet, Take 1 Tab by mouth 2 times a day, with meals., Disp: 60 Tab, Rfl: 0•  omeprazole (PRILOSEC) 20 MG delayed-release capsule, Take 1 Cap by mouth every day., Disp: 30 Cap, Rfl: 0•  lisinopril-hydrochlorothiazide (PRINZIDE, ZESTORETIC) 10-12.5 MG per tablet, TAKE 1 TAB BY MOUTH EVERY DAY. (Patient not taking: Reported on 4/30/2019), Disp: 90 Tab, Rfl: 0•  sildenafil citrate (VIAGRA) 100 MG tablet, Take 1 Tab by mouth as needed for Erectile  "Dysfunction. (Patient not taking: Reported on 4/30/2019), Disp: 10 Tab, Rfl: 3•  fluticasone (FLONASE) 50 MCG/ACT nasal spray, Spray 2 Sprays in nose every day. Each Nostril (Patient not taking: Reported on 3/24/2020), Disp: 1 Bottle, Rfl: 0    Patient was instructed on the use of medications, either prescriptions or OTC and informed on when the appropriate follow up time period should be. In addition, patient was also instructed that should any acute worsening occur that they should notify this clinic asap or call 911.          Review of Systems   Constitutional: Negative.  Negative for chills and fever.   HENT: Negative.  Negative for hearing loss.    Eyes: Negative.  Negative for blurred vision and double vision.   Respiratory: Negative.  Negative for cough and hemoptysis.    Cardiovascular: Negative.  Negative for chest pain and palpitations.   Gastrointestinal: Negative.  Negative for heartburn and nausea.   Genitourinary: Negative.  Negative for dysuria.   Musculoskeletal: Negative.  Negative for myalgias.   Skin: Negative.  Negative for rash.   Neurological: Negative.  Negative for dizziness, tingling and headaches.   Endo/Heme/Allergies: Negative.  Does not bruise/bleed easily.   Psychiatric/Behavioral: Negative.  Negative for depression and suicidal ideas.   All other systems reviewed and are negative.         Objective:     /100 (BP Location: Left arm, Patient Position: Sitting)   Pulse 92   Temp 36.3 °C (97.3 °F)   Resp 16   Ht 1.702 m (5' 7\")   Wt 112.5 kg (248 lb)   SpO2 94%   BMI 38.84 kg/m²      Physical Exam  Vitals signs and nursing note reviewed.   Constitutional:       General: He is not in acute distress.     Appearance: He is well-developed. He is not diaphoretic.   HENT:      Head: Normocephalic and atraumatic.      Mouth/Throat:      Pharynx: No oropharyngeal exudate.   Eyes:      Pupils: Pupils are equal, round, and reactive to light.   Cardiovascular:      Rate and Rhythm: " Normal rate and regular rhythm.      Heart sounds: Normal heart sounds. No murmur. No friction rub. No gallop.    Pulmonary:      Effort: Pulmonary effort is normal. No respiratory distress.      Breath sounds: Normal breath sounds. No wheezing or rales.   Chest:      Chest wall: No tenderness.   Neurological:      Mental Status: He is alert and oriented to person, place, and time.   Psychiatric:         Behavior: Behavior normal.         Thought Content: Thought content normal.         Judgment: Judgment normal.                 Assessment/Plan:       1. Diabetes mellitus without complication (HCC)    - pioglitazone (ACTOS) 45 MG Tab; Take 1 Tab by mouth every day.  Dispense: 30 Tab; Refill: 3    2. Mixed hyperlipidemia      3. Essential hypertension

## 2020-04-07 DIAGNOSIS — E11.9 DIABETES MELLITUS WITHOUT COMPLICATION (HCC): ICD-10-CM

## 2020-04-07 RX ORDER — OMEPRAZOLE 20 MG/1
CAPSULE, DELAYED RELEASE ORAL
Qty: 30 CAP | Refills: 0 | Status: SHIPPED | OUTPATIENT
Start: 2020-04-07 | End: 2020-05-04

## 2020-04-14 ENCOUNTER — PATIENT MESSAGE (OUTPATIENT)
Dept: HEALTH INFORMATION MANAGEMENT | Facility: OTHER | Age: 40
End: 2020-04-14

## 2020-04-14 DIAGNOSIS — E78.2 MIXED HYPERLIPIDEMIA: ICD-10-CM

## 2020-04-14 DIAGNOSIS — E11.9 DIABETES MELLITUS WITHOUT COMPLICATION (HCC): ICD-10-CM

## 2020-04-14 RX ORDER — ATORVASTATIN CALCIUM 10 MG/1
10 TABLET, FILM COATED ORAL
Qty: 90 TAB | Refills: 0 | Status: SHIPPED | OUTPATIENT
Start: 2020-04-14 | End: 2020-07-06

## 2020-04-14 RX ORDER — PIOGLITAZONEHYDROCHLORIDE 45 MG/1
45 TABLET ORAL
Qty: 90 TAB | Refills: 0 | Status: SHIPPED | OUTPATIENT
Start: 2020-04-14 | End: 2020-07-06

## 2020-05-04 RX ORDER — OMEPRAZOLE 20 MG/1
CAPSULE, DELAYED RELEASE ORAL
Qty: 30 CAP | Refills: 0 | Status: SHIPPED | OUTPATIENT
Start: 2020-05-04 | End: 2020-05-27

## 2020-05-27 RX ORDER — OMEPRAZOLE 20 MG/1
CAPSULE, DELAYED RELEASE ORAL
Qty: 90 CAP | Refills: 0 | Status: SHIPPED | OUTPATIENT
Start: 2020-05-27 | End: 2020-08-24

## 2020-07-03 DIAGNOSIS — E11.9 DIABETES MELLITUS WITHOUT COMPLICATION (HCC): ICD-10-CM

## 2020-07-03 DIAGNOSIS — E78.2 MIXED HYPERLIPIDEMIA: ICD-10-CM

## 2020-07-06 RX ORDER — ATORVASTATIN CALCIUM 10 MG/1
TABLET, FILM COATED ORAL
Qty: 90 TAB | Refills: 0 | Status: SHIPPED | OUTPATIENT
Start: 2020-07-06 | End: 2020-10-08

## 2020-07-06 RX ORDER — PIOGLITAZONEHYDROCHLORIDE 45 MG/1
TABLET ORAL
Qty: 90 TAB | Refills: 0 | Status: SHIPPED | OUTPATIENT
Start: 2020-07-06 | End: 2020-10-08

## 2020-07-20 DIAGNOSIS — E11.9 DIABETES MELLITUS WITHOUT COMPLICATION (HCC): ICD-10-CM

## 2020-07-31 RX ORDER — TADALAFIL 10 MG/1
10 TABLET ORAL PRN
Qty: 10 TAB | Refills: 3 | Status: SHIPPED | OUTPATIENT
Start: 2020-07-31 | End: 2021-08-09 | Stop reason: SDUPTHER

## 2020-08-24 RX ORDER — OMEPRAZOLE 20 MG/1
CAPSULE, DELAYED RELEASE ORAL
Qty: 90 CAP | Refills: 0 | Status: SHIPPED | OUTPATIENT
Start: 2020-08-24 | End: 2021-09-16

## 2020-09-28 ENCOUNTER — HOSPITAL ENCOUNTER (OUTPATIENT)
Dept: LAB | Facility: MEDICAL CENTER | Age: 40
End: 2020-09-28
Attending: NURSE PRACTITIONER
Payer: COMMERCIAL

## 2020-09-28 LAB
COVID ORDER STATUS COVID19: NORMAL
SARS-COV-2 RNA RESP QL NAA+PROBE: NOTDETECTED
SPECIMEN SOURCE: NORMAL

## 2020-09-28 PROCEDURE — U0003 INFECTIOUS AGENT DETECTION BY NUCLEIC ACID (DNA OR RNA); SEVERE ACUTE RESPIRATORY SYNDROME CORONAVIRUS 2 (SARS-COV-2) (CORONAVIRUS DISEASE [COVID-19]), AMPLIFIED PROBE TECHNIQUE, MAKING USE OF HIGH THROUGHPUT TECHNOLOGIES AS DESCRIBED BY CMS-2020-01-R: HCPCS

## 2020-09-28 PROCEDURE — C9803 HOPD COVID-19 SPEC COLLECT: HCPCS

## 2020-10-08 DIAGNOSIS — E78.2 MIXED HYPERLIPIDEMIA: ICD-10-CM

## 2020-10-08 DIAGNOSIS — E11.9 DIABETES MELLITUS WITHOUT COMPLICATION (HCC): ICD-10-CM

## 2020-10-08 RX ORDER — PIOGLITAZONEHYDROCHLORIDE 45 MG/1
TABLET ORAL
Qty: 90 TAB | Refills: 0 | Status: SHIPPED | OUTPATIENT
Start: 2020-10-08 | End: 2021-01-11

## 2020-10-08 RX ORDER — ATORVASTATIN CALCIUM 10 MG/1
TABLET, FILM COATED ORAL
Qty: 90 TAB | Refills: 0 | Status: SHIPPED | OUTPATIENT
Start: 2020-10-08 | End: 2021-01-11

## 2020-10-15 DIAGNOSIS — E11.9 DIABETES MELLITUS WITHOUT COMPLICATION (HCC): ICD-10-CM

## 2021-01-04 ENCOUNTER — HOSPITAL ENCOUNTER (OUTPATIENT)
Dept: LAB | Facility: MEDICAL CENTER | Age: 41
End: 2021-01-04
Attending: NURSE PRACTITIONER
Payer: COMMERCIAL

## 2021-01-04 LAB — COVID ORDER STATUS COVID19: NORMAL

## 2021-01-04 PROCEDURE — U0005 INFEC AGEN DETEC AMPLI PROBE: HCPCS

## 2021-01-04 PROCEDURE — U0003 INFECTIOUS AGENT DETECTION BY NUCLEIC ACID (DNA OR RNA); SEVERE ACUTE RESPIRATORY SYNDROME CORONAVIRUS 2 (SARS-COV-2) (CORONAVIRUS DISEASE [COVID-19]), AMPLIFIED PROBE TECHNIQUE, MAKING USE OF HIGH THROUGHPUT TECHNOLOGIES AS DESCRIBED BY CMS-2020-01-R: HCPCS

## 2021-01-04 PROCEDURE — C9803 HOPD COVID-19 SPEC COLLECT: HCPCS

## 2021-01-05 LAB
SARS-COV-2 RNA RESP QL NAA+PROBE: NOTDETECTED
SPECIMEN SOURCE: NORMAL

## 2021-01-11 DIAGNOSIS — E11.9 DIABETES MELLITUS WITHOUT COMPLICATION (HCC): ICD-10-CM

## 2021-01-11 DIAGNOSIS — E78.2 MIXED HYPERLIPIDEMIA: ICD-10-CM

## 2021-01-11 RX ORDER — ATORVASTATIN CALCIUM 10 MG/1
TABLET, FILM COATED ORAL
Qty: 90 TAB | Refills: 0 | Status: SHIPPED | OUTPATIENT
Start: 2021-01-11 | End: 2021-04-12

## 2021-01-11 RX ORDER — PIOGLITAZONEHYDROCHLORIDE 45 MG/1
TABLET ORAL
Qty: 90 TAB | Refills: 0 | Status: SHIPPED | OUTPATIENT
Start: 2021-01-11 | End: 2021-04-12

## 2021-02-08 ENCOUNTER — HOSPITAL ENCOUNTER (OUTPATIENT)
Dept: LAB | Facility: MEDICAL CENTER | Age: 41
End: 2021-02-08
Attending: FAMILY MEDICINE
Payer: COMMERCIAL

## 2021-02-08 DIAGNOSIS — E11.9 DIABETES MELLITUS WITHOUT COMPLICATION (HCC): ICD-10-CM

## 2021-02-08 LAB
ALBUMIN SERPL BCP-MCNC: 4.1 G/DL (ref 3.2–4.9)
ALBUMIN/GLOB SERPL: 1.3 G/DL
ALP SERPL-CCNC: 74 U/L (ref 30–99)
ALT SERPL-CCNC: 31 U/L (ref 2–50)
ANION GAP SERPL CALC-SCNC: 13 MMOL/L (ref 7–16)
AST SERPL-CCNC: 25 U/L (ref 12–45)
BILIRUB SERPL-MCNC: 0.2 MG/DL (ref 0.1–1.5)
BUN SERPL-MCNC: 13 MG/DL (ref 8–22)
CALCIUM SERPL-MCNC: 8.7 MG/DL (ref 8.5–10.5)
CHLORIDE SERPL-SCNC: 102 MMOL/L (ref 96–112)
CHOLEST SERPL-MCNC: 166 MG/DL (ref 100–199)
CO2 SERPL-SCNC: 24 MMOL/L (ref 20–33)
CREAT SERPL-MCNC: 1.02 MG/DL (ref 0.5–1.4)
EST. AVERAGE GLUCOSE BLD GHB EST-MCNC: 166 MG/DL
FASTING STATUS PATIENT QL REPORTED: NORMAL
GLOBULIN SER CALC-MCNC: 3.1 G/DL (ref 1.9–3.5)
GLUCOSE SERPL-MCNC: 165 MG/DL (ref 65–99)
HBA1C MFR BLD: 7.4 % (ref 0–5.6)
HDLC SERPL-MCNC: 36 MG/DL
LDLC SERPL CALC-MCNC: 55 MG/DL
POTASSIUM SERPL-SCNC: 3.8 MMOL/L (ref 3.6–5.5)
PROT SERPL-MCNC: 7.2 G/DL (ref 6–8.2)
SODIUM SERPL-SCNC: 139 MMOL/L (ref 135–145)
TRIGL SERPL-MCNC: 373 MG/DL (ref 0–149)

## 2021-02-08 PROCEDURE — 83036 HEMOGLOBIN GLYCOSYLATED A1C: CPT

## 2021-02-08 PROCEDURE — 80053 COMPREHEN METABOLIC PANEL: CPT

## 2021-02-08 PROCEDURE — 80061 LIPID PANEL: CPT

## 2021-02-08 PROCEDURE — 36415 COLL VENOUS BLD VENIPUNCTURE: CPT

## 2021-02-09 ENCOUNTER — TELEPHONE (OUTPATIENT)
Dept: MEDICAL GROUP | Facility: PHYSICIAN GROUP | Age: 41
End: 2021-02-09

## 2021-02-09 NOTE — TELEPHONE ENCOUNTER
Phone Number Called: [phone]    Call outcome: Spoke to patient regarding message below.    Message: Called patient to set up appointment on 2/24/21 to f/u with labs    ----- Message from Murali Levine M.D. sent at 2/9/2021  9:21 AM PST -----  This patient needs an appointment within the next week. Please schedule this with the patient so we may discuss their lab results

## 2021-02-15 ENCOUNTER — HOSPITAL ENCOUNTER (OUTPATIENT)
Facility: MEDICAL CENTER | Age: 41
End: 2021-02-15
Attending: FAMILY MEDICINE
Payer: COMMERCIAL

## 2021-02-15 PROCEDURE — 82043 UR ALBUMIN QUANTITATIVE: CPT

## 2021-02-15 PROCEDURE — 82570 ASSAY OF URINE CREATININE: CPT

## 2021-02-17 DIAGNOSIS — E11.9 DIABETES MELLITUS WITHOUT COMPLICATION (HCC): ICD-10-CM

## 2021-02-19 LAB
COLLECT DURATION TIME SPEC: 24 HRS
CREAT 24H UR-MCNC: 30 MG/DL
CREAT 24H UR-MRATE: 900 MG/D (ref 1000–2500)
MICROALBUMIN 24H UR-MCNC: 1.3 MG/DL
MICROALBUMIN/CREAT 24H UR: 43 MG/G (ref 0–30)
SPECIMEN VOL ?TM UR: 3000 ML

## 2021-02-24 ENCOUNTER — OFFICE VISIT (OUTPATIENT)
Dept: MEDICAL GROUP | Facility: PHYSICIAN GROUP | Age: 41
End: 2021-02-24
Payer: COMMERCIAL

## 2021-02-24 VITALS
HEIGHT: 67 IN | SYSTOLIC BLOOD PRESSURE: 118 MMHG | BODY MASS INDEX: 39.22 KG/M2 | OXYGEN SATURATION: 95 % | RESPIRATION RATE: 20 BRPM | WEIGHT: 249.9 LBS | TEMPERATURE: 97.8 F | HEART RATE: 74 BPM | DIASTOLIC BLOOD PRESSURE: 70 MMHG

## 2021-02-24 DIAGNOSIS — E11.9 DIABETES MELLITUS WITHOUT COMPLICATION (HCC): ICD-10-CM

## 2021-02-24 DIAGNOSIS — I10 ESSENTIAL HYPERTENSION: ICD-10-CM

## 2021-02-24 DIAGNOSIS — E66.9 OBESITY (BMI 30-39.9): ICD-10-CM

## 2021-02-24 DIAGNOSIS — E78.2 MIXED HYPERLIPIDEMIA: ICD-10-CM

## 2021-02-24 PROCEDURE — 99214 OFFICE O/P EST MOD 30 MIN: CPT | Performed by: FAMILY MEDICINE

## 2021-02-24 ASSESSMENT — ENCOUNTER SYMPTOMS
NAUSEA: 0
CARDIOVASCULAR NEGATIVE: 1
GASTROINTESTINAL NEGATIVE: 1
CONSTITUTIONAL NEGATIVE: 1
DOUBLE VISION: 0
DIZZINESS: 0
COUGH: 0
MYALGIAS: 0
PSYCHIATRIC NEGATIVE: 1
HEARTBURN: 0
BLURRED VISION: 0
PALPITATIONS: 0
TINGLING: 0
EYES NEGATIVE: 1
HEMOPTYSIS: 0
FEVER: 0
NEUROLOGICAL NEGATIVE: 1
BRUISES/BLEEDS EASILY: 0
MUSCULOSKELETAL NEGATIVE: 1
CHILLS: 0
DEPRESSION: 0
HEADACHES: 0
RESPIRATORY NEGATIVE: 1

## 2021-02-24 ASSESSMENT — PATIENT HEALTH QUESTIONNAIRE - PHQ9: CLINICAL INTERPRETATION OF PHQ2 SCORE: 0

## 2021-02-24 NOTE — PROGRESS NOTES
Subjective:      Yannick López is a 40 y.o. male who presents with Follow-Up (Lab review)            1. Diabetes mellitus without complication (HCC)  Currently treated for DM, taking meds and checking bs at home, trying to do DM diet.controlled  a1c markedly better at 7.4 from over 10  - REFERRAL TO OPHTHALMOLOGY    2. Obesity (BMI 30-39.9)    - Patient identified as having weight management issue.  Appropriate orders and counseling given.    3. Mixed hyperlipidemia  Currently treated for HLD, taking meds with no new myalgias or joint pain, watching fats in diet  controlled      4. Essential hypertension  Currently treated for HTN, taking meds with no CP or sob, monitors bp at home periodically. controlled      Past Medical History:  No date: Diabetes (HCC)      Comment:  2 years ago was on meds, today nfbs 235  No date: GERD (gastroesophageal reflux disease)  No date: Hyperlipidemia  Past Surgical History:  No date: ACL RECONSTRUCTION; Left  No date: OTHER ORTHOPEDIC SURGERY      Comment:  left knee acl. 2000  Social History    Tobacco Use      Smoking status: Never Smoker      Smokeless tobacco: Current User    Alcohol use: Yes      Comment: occ    Drug use: No    Review of patient's family history indicates:  Problem: Heart Disease      Relation: Father          Age of Onset: (Not Specified)  Problem: Hyperlipidemia      Relation: Father          Age of Onset: (Not Specified)      Current Outpatient Medications: •  metformin (GLUCOPHAGE) 1000 MG tablet, TAKE 1 TAB BY MOUTH 2 TIMES A DAY FOR 90 DAYS., Disp: 180 Tab, Rfl: 0•  atorvastatin (LIPITOR) 10 MG Tab, TAKE 1 TABLET BY MOUTH EVERY DAY, Disp: 90 Tab, Rfl: 0•  pioglitazone (ACTOS) 45 MG Tab, TAKE 1 TABLET BY MOUTH EVERY DAY, Disp: 90 Tab, Rfl: 0•  omeprazole (PRILOSEC) 20 MG delayed-release capsule, TAKE 1 CAPSULE BY MOUTH EVERY DAY, Disp: 90 Cap, Rfl: 0•  tadalafil (CIALIS) 10 MG tablet, Take 1 Tab by mouth as needed for Erectile Dysfunction., Disp: 10 Tab,  "Rfl: 3•  lisinopril-hydrochlorothiazide (PRINZIDE, ZESTORETIC) 10-12.5 MG per tablet, TAKE 1 TAB BY MOUTH EVERY DAY. (Patient not taking: Reported on 4/30/2019), Disp: 90 Tab, Rfl: 0•  sildenafil citrate (VIAGRA) 100 MG tablet, Take 1 Tab by mouth as needed for Erectile Dysfunction. (Patient not taking: Reported on 4/30/2019), Disp: 10 Tab, Rfl: 3•  fluticasone (FLONASE) 50 MCG/ACT nasal spray, Spray 2 Sprays in nose every day. Each Nostril (Patient not taking: Reported on 3/24/2020), Disp: 1 Bottle, Rfl: 0    Patient was instructed on the use of medications, either prescriptions or OTC and informed on when the appropriate follow up time period should be. In addition, patient was also instructed that should any acute worsening occur that they should notify this clinic asap or call 911.          Review of Systems   Constitutional: Negative.  Negative for chills and fever.   HENT: Negative.  Negative for hearing loss.    Eyes: Negative.  Negative for blurred vision and double vision.   Respiratory: Negative.  Negative for cough and hemoptysis.    Cardiovascular: Negative.  Negative for chest pain and palpitations.   Gastrointestinal: Negative.  Negative for heartburn and nausea.   Genitourinary: Negative.  Negative for dysuria.   Musculoskeletal: Negative.  Negative for myalgias.   Skin: Negative.  Negative for rash.   Neurological: Negative.  Negative for dizziness, tingling and headaches.   Endo/Heme/Allergies: Negative.  Does not bruise/bleed easily.   Psychiatric/Behavioral: Negative.  Negative for depression and suicidal ideas.   All other systems reviewed and are negative.         Objective:     /70   Pulse 74   Temp 36.6 °C (97.8 °F) (Temporal)   Resp 20   Ht 1.702 m (5' 7\")   Wt 113 kg (249 lb 14.4 oz)   SpO2 95%   BMI 39.14 kg/m²      Physical Exam  Vitals and nursing note reviewed.   Constitutional:       General: He is not in acute distress.     Appearance: He is well-developed. He is not " diaphoretic.   HENT:      Head: Normocephalic and atraumatic.      Mouth/Throat:      Pharynx: No oropharyngeal exudate.   Eyes:      Pupils: Pupils are equal, round, and reactive to light.   Cardiovascular:      Rate and Rhythm: Normal rate and regular rhythm.      Heart sounds: Normal heart sounds. No murmur. No friction rub. No gallop.    Pulmonary:      Effort: Pulmonary effort is normal. No respiratory distress.      Breath sounds: Normal breath sounds. No wheezing or rales.   Chest:      Chest wall: No tenderness.   Neurological:      Mental Status: He is alert and oriented to person, place, and time.   Psychiatric:         Behavior: Behavior normal.         Thought Content: Thought content normal.         Judgment: Judgment normal.                 Assessment/Plan:        1. Diabetes mellitus without complication (HCC)    - REFERRAL TO OPHTHALMOLOGY    2. Obesity (BMI 30-39.9)    - Patient identified as having weight management issue.  Appropriate orders and counseling given.    3. Mixed hyperlipidemia      4. Essential hypertension

## 2021-03-13 ENCOUNTER — APPOINTMENT (OUTPATIENT)
Dept: RADIOLOGY | Facility: MEDICAL CENTER | Age: 41
End: 2021-03-13
Attending: EMERGENCY MEDICINE
Payer: COMMERCIAL

## 2021-03-13 ENCOUNTER — HOSPITAL ENCOUNTER (EMERGENCY)
Facility: MEDICAL CENTER | Age: 41
End: 2021-03-13
Attending: EMERGENCY MEDICINE
Payer: COMMERCIAL

## 2021-03-13 VITALS
HEART RATE: 100 BPM | BODY MASS INDEX: 36.37 KG/M2 | HEIGHT: 68 IN | DIASTOLIC BLOOD PRESSURE: 89 MMHG | RESPIRATION RATE: 16 BRPM | WEIGHT: 240 LBS | SYSTOLIC BLOOD PRESSURE: 153 MMHG | OXYGEN SATURATION: 94 % | TEMPERATURE: 97.9 F

## 2021-03-13 DIAGNOSIS — S93.622A LISFRANC'S SPRAIN, LEFT, INITIAL ENCOUNTER: ICD-10-CM

## 2021-03-13 DIAGNOSIS — S92.325A CLOSED NONDISPLACED FRACTURE OF SECOND METATARSAL BONE OF LEFT FOOT, INITIAL ENCOUNTER: ICD-10-CM

## 2021-03-13 DIAGNOSIS — S92.215A CLOSED NONDISPLACED FRACTURE OF CUBOID OF LEFT FOOT, INITIAL ENCOUNTER: ICD-10-CM

## 2021-03-13 DIAGNOSIS — S92.315A CLOSED NONDISPLACED FRACTURE OF FIRST METATARSAL BONE OF LEFT FOOT, INITIAL ENCOUNTER: ICD-10-CM

## 2021-03-13 PROCEDURE — 302875 HCHG BANDAGE ACE 4 OR 6""

## 2021-03-13 PROCEDURE — 73700 CT LOWER EXTREMITY W/O DYE: CPT | Mod: LT

## 2021-03-13 PROCEDURE — 73630 X-RAY EXAM OF FOOT: CPT | Mod: LT

## 2021-03-13 PROCEDURE — 90471 IMMUNIZATION ADMIN: CPT

## 2021-03-13 PROCEDURE — 73610 X-RAY EXAM OF ANKLE: CPT | Mod: LT

## 2021-03-13 PROCEDURE — 90715 TDAP VACCINE 7 YRS/> IM: CPT | Performed by: EMERGENCY MEDICINE

## 2021-03-13 PROCEDURE — A9270 NON-COVERED ITEM OR SERVICE: HCPCS | Performed by: EMERGENCY MEDICINE

## 2021-03-13 PROCEDURE — 700102 HCHG RX REV CODE 250 W/ 637 OVERRIDE(OP): Performed by: EMERGENCY MEDICINE

## 2021-03-13 PROCEDURE — 29515 APPLICATION SHORT LEG SPLINT: CPT

## 2021-03-13 PROCEDURE — 700111 HCHG RX REV CODE 636 W/ 250 OVERRIDE (IP): Performed by: EMERGENCY MEDICINE

## 2021-03-13 PROCEDURE — 99284 EMERGENCY DEPT VISIT MOD MDM: CPT

## 2021-03-13 RX ORDER — HYDROCODONE BITARTRATE AND ACETAMINOPHEN 5; 325 MG/1; MG/1
1 TABLET ORAL EVERY 6 HOURS PRN
Qty: 21 TABLET | Refills: 0 | Status: SHIPPED | OUTPATIENT
Start: 2021-03-13 | End: 2021-03-18

## 2021-03-13 RX ORDER — ACETAMINOPHEN 325 MG/1
650 TABLET ORAL ONCE
Status: COMPLETED | OUTPATIENT
Start: 2021-03-13 | End: 2021-03-13

## 2021-03-13 RX ADMIN — ACETAMINOPHEN 650 MG: 325 TABLET, FILM COATED ORAL at 19:01

## 2021-03-13 RX ADMIN — CLOSTRIDIUM TETANI TOXOID ANTIGEN (FORMALDEHYDE INACTIVATED), CORYNEBACTERIUM DIPHTHERIAE TOXOID ANTIGEN (FORMALDEHYDE INACTIVATED), BORDETELLA PERTUSSIS TOXOID ANTIGEN (GLUTARALDEHYDE INACTIVATED), BORDETELLA PERTUSSIS FILAMENTOUS HEMAGGLUTININ ANTIGEN (FORMALDEHYDE INACTIVATED), BORDETELLA PERTUSSIS PERTACTIN ANTIGEN, AND BORDETELLA PERTUSSIS FIMBRIAE 2/3 ANTIGEN 0.5 ML: 5; 2; 2.5; 5; 3; 5 INJECTION, SUSPENSION INTRAMUSCULAR at 19:01

## 2021-03-13 ASSESSMENT — PAIN DESCRIPTION - DESCRIPTORS: DESCRIPTORS: ACHING

## 2021-03-14 NOTE — ED NOTES
Dc instructions and medications discussed with patient at bedside. All questions answered at this time. VSS. No IV in place at this time. Pt to lobby without incident. spouse to drive patient home.   Informed consent for narcs reviewed with patient.

## 2021-03-14 NOTE — ED TRIAGE NOTES
40 yr old male to triage via wheel chair  Chief Complaint   Patient presents with   • T-5000 Ankle Injury     Patient report he was playing with his kids injuried the left ankle.  Abrasion to the bilateral knee and swelling to left ankle.    • Foot Pain

## 2021-03-14 NOTE — ED PROVIDER NOTES
ED Provider Note    CHIEF COMPLAINT  Chief Complaint   Patient presents with   • T-5000 Ankle Injury     Patient report he was playing with his kids injuried the left ankle.  Abrasion to the bilateral knee and swelling to left ankle.    • Foot Pain       HPI  Yannick López is a 40 y.o. male who presents to the emergency department chief complaint of left ankle and left foot pain.  Patient states he was playing with his kids in the yard and tried to jump over a low fence caught his foot on the metal fence and then landed funny.  He states he has been able to weight-bear since this occurred around 330 this afternoon.  Mostly pain in the foot and ankle.  He did abrade both of his knees and states they feel fine no hip pain no head trauma otherwise healthy he is unsure when his last tetanus shot was.  Pain is currently about a 6 out of 10 and worse with ambulation he is unable to bear weight    REVIEW OF SYSTEMS  Positives as above. Pertinent negatives include weakness numbness tingling knee pain hip pain head trauma easy bleeding or bruising  All other review of systems are negative    PAST MEDICAL HISTORY   has a past medical history of Diabetes (HCC), GERD (gastroesophageal reflux disease), and Hyperlipidemia.    SOCIAL HISTORY  Social History     Tobacco Use   • Smoking status: Never Smoker   • Smokeless tobacco: Current User   Substance and Sexual Activity   • Alcohol use: Yes     Comment: occ   • Drug use: No   • Sexual activity: Yes     Partners: Female       SURGICAL HISTORY   has a past surgical history that includes other orthopedic surgery and acl reconstruction (Left).    CURRENT MEDICATIONS  Home Medications     Reviewed by Soco Carlos R.N. (Registered Nurse) on 03/13/21 at 1811  Med List Status: Complete   Medication Last Dose Status   atorvastatin (LIPITOR) 10 MG Tab  Active   metformin (GLUCOPHAGE) 1000 MG tablet 3/13/2021 Active   omeprazole (PRILOSEC) 20 MG delayed-release capsule  "3/13/2021 Active   pioglitazone (ACTOS) 45 MG Tab 3/13/2021 Active   sildenafil citrate (VIAGRA) 100 MG tablet  Active   tadalafil (CIALIS) 10 MG tablet  Active                ALLERGIES  No Known Allergies    PHYSICAL EXAM  VITAL SIGNS: /83   Pulse (!) 107   Temp 36.8 °C (98.3 °F) (Oral)   Resp 16   Ht 1.727 m (5' 8\")   Wt 109 kg (240 lb)   SpO2 95%   BMI 36.49 kg/m²    Pulse ox interpretation: I interpret this pulse ox as normal.  Constitutional: Alert in no apparent distress.  HENT: Normocephalic, Atraumatic, MMM  Eyes: PERound. Conjunctiva normal, non-icteric.   Heart: Regular rate and rhythm, no murmurs.    Lungs: Clear to auscultation bilaterally. No resp distress, breath sounds equal  EXT: Abrasions to bilateral shins knees there is a little bit of a avulsion of the skin to the left shin no active bleeding is not suturable.  Left ankle with mild lateral malleoli or tenderness mild medial malleolar tenderness and mostly some swelling in the midfoot with some tenderness and DP pulse of 2+ able to range the ankle limited although secondary to pain able move all toes sensation intact light touch distally  Skin: Warm, Dry, No erythema, No rash.   Neurologic: Alert and oriented, Grossly non-focal.       DIFFERENTIAL DIAGNOSIS AND WORK UP PLAN    This is a 40 y.o. male who presents with ankle and midfoot swelling and unable to bear weight he does meet the [place] ankle rules and he has swelling of the midfoot although no tenderness at the fifth metacarpal but due to swelling and midfoot tenderness will receive an x-ray of the foot.  He will be treated with oral Tylenol as he received ibuprofen at home as well as an ice pack x-rays and reassessment.  He will also be updated on his tetanus    Pertinent Lab Findings  Labs Reviewed - No data to display    Radiology  CT-FOOT W/O PLUS RECONS LEFT   Final Result         Acute mildly displaced fractures at the bases of the first and second metatarsals as well as " distal lateral aspect of the lateral cuneiform and cuboid.      Additional small fragment seen between the middle and lateral cuneiform as well.      Lisfranc joint injury with widening of the Lisfranc joint as well as step-off between first and second metatarsals and the cuneiforms      DX-ANKLE 3+ VIEWS LEFT   Final Result      No acute osseous abnormality.      DX-FOOT-COMPLETE 3+ LEFT   Final Result         Irregular appearance of the base of the second metatarsal as well as the middle and medial cuneiforms, concerning for fracture.      Questionable widening of the Lisfranc joint, concerning for Lisfranc injury.        The radiologist's interpretation of all radiological studies have been reviewed by me.    COURSE & MEDICAL DECISION MAKING  Pertinent Labs & Imaging studies reviewed. (See chart for details)      7:45 PM  Spoke w Dr Thomas with the [facility] orthopedic team, he is recommending a CT scan of the foot and feels comfortable with this plan discharge home after that is completed.    8:33 PM  I reassessed the patient at the bedside, he is placed in a posterior short leg splint he is given crutches and we discussed nonweightbearing elevation and ice packs and pain management as needed.  He will follow up with orthopedics this next week and return to the ED for any severe worsening pain weakness numbness or tingling    In prescribing controlled substances to this patient, I certify that I have obtained and reviewed the medical history of Yannick López. I have also made a good marylou effort to obtain applicable records from other providers who have treated the patient and records did not demonstrate any increased risk of substance abuse that would prevent me from prescribing controlled substances.     I have conducted a physical exam and documented it. I have reviewed Mr. López’s prescription history as maintained by the [place] Prescription Monitoring Program.     I have assessed the patient’s risk for  "abuse, dependency, and addiction using the validated Opioid Risk Tool available at https://www.mdcalc.com/ufgkhk-nplf-rmhf-ort-narcotic-abuse. 0    Given the above, I believe the benefits of controlled substance therapy outweigh the risks. The reasons for prescribing controlled substances include in my professional opinion, controlled substances are the only reasonable choice for this patient because of multiple fractures and partial dislocation. Accordingly, I have discussed the risk and benefits, treatment plan, and alternative therapies with the patient.     /89   Pulse 100   Temp 36.6 °C (97.9 °F) (Oral)   Resp 16   Ht 1.727 m (5' 8\")   Wt 109 kg (240 lb)   SpO2 94%   BMI 36.49 kg/m²         I verified that the patient was wearing a mask and I was wearing appropriate PPE every time I entered the room. The patient's mask was on the patient at all times during my encounter except for a brief view of the oropharynx.    The patient will return for new or worsening symptoms and is stable at the time of discharge.    The patient is referred to a primary physician for blood pressure management, diabetic screening, and for all other preventative health concerns.    DISPOSITION:  Patient will be discharged home in stable condition.    FOLLOW UP:  Oleksandr Thomas M.D.  [address] [facility] [id number] [phone]            OUTPATIENT MEDICATIONS:  Discharge Medication List as of 3/13/2021  8:37 PM      START taking these medications    Details   HYDROcodone-acetaminophen (NORCO) 5-325 MG Tab per tablet Take 1 tablet by mouth every 6 hours as needed for up to 5 days., Disp-21 tablet, R-0, Normal               FINAL IMPRESSION  1. Lisfranc's sprain, left, initial encounter  REFERRAL TO ORTHOPEDICS    HYDROcodone-acetaminophen (NORCO) 5-325 MG Tab per tablet   2. Closed nondisplaced fracture of first metatarsal bone of left foot, initial encounter  REFERRAL TO ORTHOPEDICS    HYDROcodone-acetaminophen " (NORCO) 5-325 MG Tab per tablet   3. Closed nondisplaced fracture of second metatarsal bone of left foot, initial encounter  REFERRAL TO ORTHOPEDICS    HYDROcodone-acetaminophen (NORCO) 5-325 MG Tab per tablet   4. Closed nondisplaced fracture of cuboid of left foot, initial encounter  REFERRAL TO ORTHOPEDICS    HYDROcodone-acetaminophen (NORCO) 5-325 MG Tab per tablet              Electronically signed by: Alma Delia Adame M.D., 3/13/2021 6:24 PM    This dictation has been created using voice recognition software and/or scribes. The accuracy of the dictation is limited by the abilities of the software and the expertise of the scribes. I expect there may be some errors of grammar and possibly content. I made every attempt to manually correct the errors within my dictation. However, errors related to voice recognition software and/or scribes may still exist and should be interpreted within the appropriate context.

## 2021-03-14 NOTE — DISCHARGE INSTRUCTIONS
Do not drive, operate any machinery, or partake in dangerous activities that require maximum physical and mental performance while taking the prescribed pain killer. Do not take tylenol or tylenol containing products in addition to the pain killer that was prescibed, as the excess tylenol can cause life threatening liver problems. Do not combine this drug with alcohol or other sedatives or narcotics. Follow up with your primary care doctor in regards to the future management of this medication.      Do not bear weight on your left lower extremity.  Please call the orthopedic office on Monday for follow-up and return emergency department with uncontrolled pain.  You can rotate the pain management with NSAIDs as needed.

## 2021-03-19 ENCOUNTER — HOSPITAL ENCOUNTER (OUTPATIENT)
Dept: HOSPITAL 8 - STAR | Age: 41
Discharge: HOME | End: 2021-03-19
Attending: ORTHOPAEDIC SURGERY
Payer: COMMERCIAL
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
## 2021-03-19 DIAGNOSIS — S93.325A: ICD-10-CM
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
## 2021-03-19 DIAGNOSIS — Z01.818: Primary | ICD-10-CM

## 2021-03-19 DIAGNOSIS — M79.675: ICD-10-CM

## 2021-03-19 DIAGNOSIS — Z20.822: ICD-10-CM

## 2021-03-19 LAB
ALBUMIN SERPL-MCNC: 3.7 G/DL (ref 3.4–5)
ALP SERPL-CCNC: 75 U/L (ref 45–117)
ALT SERPL-CCNC: 34 U/L (ref 12–78)
ANION GAP SERPL CALC-SCNC: 4 MMOL/L (ref 5–15)
BILIRUB SERPL-MCNC: 0.3 MG/DL (ref 0.2–1)
CALCIUM SERPL-MCNC: 9 MG/DL (ref 8.5–10.1)
CHLORIDE SERPL-SCNC: 105 MMOL/L (ref 98–107)
CREAT SERPL-MCNC: 1.12 MG/DL (ref 0.7–1.3)
PROT SERPL-MCNC: 7.8 G/DL (ref 6.4–8.2)

## 2021-03-19 PROCEDURE — 80053 COMPREHEN METABOLIC PANEL: CPT

## 2021-03-19 PROCEDURE — 93005 ELECTROCARDIOGRAM TRACING: CPT

## 2021-03-19 PROCEDURE — U0003 INFECTIOUS AGENT DETECTION BY NUCLEIC ACID (DNA OR RNA); SEVERE ACUTE RESPIRATORY SYNDROME CORONAVIRUS 2 (SARS-COV-2) (CORONAVIRUS DISEASE [COVID-19]), AMPLIFIED PROBE TECHNIQUE, MAKING USE OF HIGH THROUGHPUT TECHNOLOGIES AS DESCRIBED BY CMS-2020-01-R: HCPCS

## 2021-03-19 PROCEDURE — 36415 COLL VENOUS BLD VENIPUNCTURE: CPT

## 2021-03-24 ENCOUNTER — HOSPITAL ENCOUNTER (OUTPATIENT)
Dept: HOSPITAL 8 - OUT | Age: 41
Discharge: HOME | End: 2021-03-24
Attending: ORTHOPAEDIC SURGERY
Payer: COMMERCIAL

## 2021-03-24 VITALS — BODY MASS INDEX: 38.72 KG/M2 | HEIGHT: 67 IN | WEIGHT: 246.7 LBS

## 2021-03-24 VITALS — DIASTOLIC BLOOD PRESSURE: 88 MMHG | SYSTOLIC BLOOD PRESSURE: 137 MMHG
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
## 2021-03-24 DIAGNOSIS — S93.322A: Primary | ICD-10-CM
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
## 2021-03-24 DIAGNOSIS — G47.30: ICD-10-CM

## 2021-03-24 DIAGNOSIS — E11.9: ICD-10-CM
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
## 2021-03-24 DIAGNOSIS — G89.18: ICD-10-CM

## 2021-03-24 DIAGNOSIS — E78.5: ICD-10-CM

## 2021-03-24 DIAGNOSIS — Z79.84: ICD-10-CM

## 2021-03-24 DIAGNOSIS — F17.200: ICD-10-CM

## 2021-03-24 DIAGNOSIS — Z79.899: ICD-10-CM

## 2021-03-24 DIAGNOSIS — Z82.49: ICD-10-CM

## 2021-03-24 PROCEDURE — 20902 REMOVAL OF BONE FOR GRAFT: CPT

## 2021-03-24 PROCEDURE — 28615 REPAIR FOOT DISLOCATION: CPT

## 2021-03-24 PROCEDURE — C1713 ANCHOR/SCREW BN/BN,TIS/BN: HCPCS

## 2021-03-24 PROCEDURE — 76000 FLUOROSCOPY <1 HR PHYS/QHP: CPT

## 2021-03-24 PROCEDURE — 73630 X-RAY EXAM OF FOOT: CPT

## 2021-03-24 PROCEDURE — 28730 FUSION OF FOOT BONES: CPT

## 2021-03-24 PROCEDURE — 82962 GLUCOSE BLOOD TEST: CPT

## 2021-03-24 PROCEDURE — 64445 NJX AA&/STRD SCIATIC NRV IMG: CPT

## 2021-04-01 ENCOUNTER — TELEMEDICINE (OUTPATIENT)
Dept: TELEHEALTH | Facility: TELEMEDICINE | Age: 41
End: 2021-04-01
Payer: COMMERCIAL

## 2021-04-01 DIAGNOSIS — H10.32 ACUTE BACTERIAL CONJUNCTIVITIS OF LEFT EYE: ICD-10-CM

## 2021-04-01 PROCEDURE — 99213 OFFICE O/P EST LOW 20 MIN: CPT | Mod: 95,CR | Performed by: NURSE PRACTITIONER

## 2021-04-01 RX ORDER — POLYMYXIN B SULFATE AND TRIMETHOPRIM 1; 10000 MG/ML; [USP'U]/ML
1 SOLUTION OPHTHALMIC EVERY 4 HOURS
Qty: 10 ML | Refills: 0 | Status: SHIPPED | OUTPATIENT
Start: 2021-04-01 | End: 2021-04-06

## 2021-04-02 ASSESSMENT — ENCOUNTER SYMPTOMS
BLURRED VISION: 0
EYE DISCHARGE: 1
EYE REDNESS: 1
DIAPHORESIS: 0
PHOTOPHOBIA: 0
DOUBLE VISION: 0
CHILLS: 0
FEVER: 0
COUGH: 0
SINUS PAIN: 0
SORE THROAT: 0
EYE PAIN: 0

## 2021-04-02 NOTE — PROGRESS NOTES
Subjective:      Andrea López is a 40 y.o. male who presents with No chief complaint on file.            Andrea presents for a virtual visit.  Identification was verified.  Patient was informed that encounter would be conducted over a secure, encrypted network and consent was obtained.  He presents with a 5 day history of a gritty sensation of the left eye with watery drainage and crusting.  He notes redness of the lids.  Denies any trauma to the the, exposure to irritants or suspected foreign body.  Denies any URI symptoms, vision changes, eye pain, periorbital swelling or TTP.  He does not wear contact lenses.  No contacts with conjunctivitis.        Review of Systems   Constitutional: Negative for chills, diaphoresis, fever and malaise/fatigue.   HENT: Negative for congestion, ear pain, sinus pain and sore throat.    Eyes: Positive for discharge and redness. Negative for blurred vision, double vision, photophobia and pain.   Respiratory: Negative for cough.      Medications, Allergies, and current problem list reviewed today in Epic     Objective:     There were no vitals taken for this visit.     Physical Exam  Constitutional:       General: He is not in acute distress.     Appearance: Normal appearance. He is not ill-appearing, toxic-appearing or diaphoretic.   HENT:      Nose: Nose normal.   Eyes:      General: No scleral icterus.        Right eye: No discharge.         Left eye: Discharge present.     Extraocular Movements: Extraocular movements intact.      Comments: Left eyelids are mildly erythematous without swelling or TTP, likely secondary to rubbing.  Mild conjunctival injection with increased watery discharge.  Matting purulence at inner canthus.  No hyphema or hypopyon.  No overt foreign body.     Pulmonary:      Effort: Pulmonary effort is normal.   Neurological:      Mental Status: He is alert and oriented to person, place, and time.                 Assessment/Plan:        1. Acute bacterial  conjunctivitis of left eye  - polymixin-trimethoprim (POLYTRIM) 69066-0.1 UNIT/ML-% Solution; Administer 1 Drop into the left eye every 4 hours for 5 days.  Dispense: 10 mL; Refill: 0    Discussed with Andrea that conjunctivitis can be bacterial, viral, or allergic.  Will treat presuming a bacterial infection.  Differential reviewed.  Advised that it is highly contagious and avoid touching face and eyes.  Clean any crusting or matting from eyes with a warm cloth and launder cloth before reuse.  Maintain adequate po hydration.  RTC in 2-3 days if symptoms do not improve, sooner if worse.  He verbalized understanding of and agreed with plan of care.

## 2021-04-12 DIAGNOSIS — E78.2 MIXED HYPERLIPIDEMIA: ICD-10-CM

## 2021-04-12 DIAGNOSIS — E11.9 DIABETES MELLITUS WITHOUT COMPLICATION (HCC): ICD-10-CM

## 2021-04-12 RX ORDER — ATORVASTATIN CALCIUM 10 MG/1
TABLET, FILM COATED ORAL
Qty: 90 TABLET | Refills: 0 | Status: SHIPPED | OUTPATIENT
Start: 2021-04-12 | End: 2021-07-06

## 2021-04-12 RX ORDER — PIOGLITAZONEHYDROCHLORIDE 45 MG/1
TABLET ORAL
Qty: 90 TABLET | Refills: 0 | Status: SHIPPED | OUTPATIENT
Start: 2021-04-12 | End: 2021-07-06

## 2021-07-03 DIAGNOSIS — E78.2 MIXED HYPERLIPIDEMIA: ICD-10-CM

## 2021-07-03 DIAGNOSIS — E11.9 DIABETES MELLITUS WITHOUT COMPLICATION (HCC): ICD-10-CM

## 2021-07-06 RX ORDER — PIOGLITAZONEHYDROCHLORIDE 45 MG/1
TABLET ORAL
Qty: 90 TABLET | Refills: 0 | Status: SHIPPED | OUTPATIENT
Start: 2021-07-06 | End: 2021-08-09 | Stop reason: SDUPTHER

## 2021-07-06 RX ORDER — ATORVASTATIN CALCIUM 10 MG/1
TABLET, FILM COATED ORAL
Qty: 90 TABLET | Refills: 0 | Status: SHIPPED | OUTPATIENT
Start: 2021-07-06 | End: 2021-08-09 | Stop reason: SDUPTHER

## 2021-07-21 PROBLEM — M65.4 DE QUERVAIN'S TENOSYNOVITIS, RIGHT: Status: ACTIVE | Noted: 2021-07-21

## 2021-07-26 ENCOUNTER — APPOINTMENT (OUTPATIENT)
Dept: ADMISSIONS | Facility: MEDICAL CENTER | Age: 41
End: 2021-07-26
Payer: COMMERCIAL

## 2021-07-26 ENCOUNTER — PRE-ADMISSION TESTING (OUTPATIENT)
Dept: ADMISSIONS | Facility: MEDICAL CENTER | Age: 41
End: 2021-07-26
Attending: ORTHOPAEDIC SURGERY
Payer: COMMERCIAL

## 2021-07-26 RX ORDER — IBUPROFEN 200 MG
400 TABLET ORAL EVERY 6 HOURS PRN
COMMUNITY
End: 2021-09-15

## 2021-07-27 ENCOUNTER — APPOINTMENT (OUTPATIENT)
Dept: ADMISSIONS | Facility: MEDICAL CENTER | Age: 41
End: 2021-07-27
Attending: ORTHOPAEDIC SURGERY
Payer: COMMERCIAL

## 2021-07-27 DIAGNOSIS — Z01.810 PRE-OPERATIVE CARDIOVASCULAR EXAMINATION: ICD-10-CM

## 2021-07-27 DIAGNOSIS — Z01.812 PRE-OPERATIVE LABORATORY EXAMINATION: ICD-10-CM

## 2021-08-09 DIAGNOSIS — E11.9 DIABETES MELLITUS WITHOUT COMPLICATION (HCC): ICD-10-CM

## 2021-08-09 DIAGNOSIS — E78.2 MIXED HYPERLIPIDEMIA: ICD-10-CM

## 2021-08-09 RX ORDER — TADALAFIL 10 MG/1
10 TABLET ORAL PRN
Qty: 10 TABLET | Refills: 3 | Status: SHIPPED | OUTPATIENT
Start: 2021-08-09 | End: 2021-09-15

## 2021-08-09 RX ORDER — ATORVASTATIN CALCIUM 10 MG/1
10 TABLET, FILM COATED ORAL
Qty: 90 TABLET | Refills: 0 | Status: SHIPPED | OUTPATIENT
Start: 2021-08-09 | End: 2021-10-29 | Stop reason: SDUPTHER

## 2021-08-09 RX ORDER — PIOGLITAZONEHYDROCHLORIDE 45 MG/1
45 TABLET ORAL
Qty: 90 TABLET | Refills: 0 | Status: SHIPPED | OUTPATIENT
Start: 2021-08-09 | End: 2021-10-29 | Stop reason: SDUPTHER

## 2021-09-15 ENCOUNTER — OFFICE VISIT (OUTPATIENT)
Dept: MEDICAL GROUP | Facility: PHYSICIAN GROUP | Age: 41
End: 2021-09-15
Payer: COMMERCIAL

## 2021-09-15 VITALS
BODY MASS INDEX: 38.24 KG/M2 | DIASTOLIC BLOOD PRESSURE: 88 MMHG | SYSTOLIC BLOOD PRESSURE: 132 MMHG | TEMPERATURE: 97.3 F | HEART RATE: 87 BPM | WEIGHT: 243.6 LBS | HEIGHT: 67 IN | RESPIRATION RATE: 16 BRPM | OXYGEN SATURATION: 94 %

## 2021-09-15 DIAGNOSIS — E78.2 MIXED HYPERLIPIDEMIA: ICD-10-CM

## 2021-09-15 DIAGNOSIS — E66.9 OBESITY (BMI 35.0-39.9 WITHOUT COMORBIDITY): ICD-10-CM

## 2021-09-15 DIAGNOSIS — I10 ESSENTIAL HYPERTENSION: ICD-10-CM

## 2021-09-15 DIAGNOSIS — E11.9 CONTROLLED TYPE 2 DIABETES MELLITUS WITHOUT COMPLICATION, WITHOUT LONG-TERM CURRENT USE OF INSULIN (HCC): ICD-10-CM

## 2021-09-15 DIAGNOSIS — Z23 NEED FOR VACCINATION: ICD-10-CM

## 2021-09-15 PROBLEM — M65.4 DE QUERVAIN'S TENOSYNOVITIS, RIGHT: Status: RESOLVED | Noted: 2021-07-21 | Resolved: 2021-09-15

## 2021-09-15 PROBLEM — M65.4 DE QUERVAIN'S TENOSYNOVITIS, RIGHT: Status: ACTIVE | Noted: 2021-07-21

## 2021-09-15 LAB
HBA1C MFR BLD: 7.2 % (ref 0–5.6)
INT CON NEG: NEGATIVE
INT CON POS: POSITIVE

## 2021-09-15 PROCEDURE — 99214 OFFICE O/P EST MOD 30 MIN: CPT | Mod: 25 | Performed by: FAMILY MEDICINE

## 2021-09-15 PROCEDURE — 83036 HEMOGLOBIN GLYCOSYLATED A1C: CPT | Performed by: FAMILY MEDICINE

## 2021-09-15 PROCEDURE — 90732 PPSV23 VACC 2 YRS+ SUBQ/IM: CPT | Performed by: FAMILY MEDICINE

## 2021-09-15 PROCEDURE — 90471 IMMUNIZATION ADMIN: CPT | Performed by: FAMILY MEDICINE

## 2021-09-15 RX ORDER — ATORVASTATIN CALCIUM 10 MG/1
TABLET, FILM COATED ORAL
COMMUNITY
End: 2021-09-15

## 2021-09-15 RX ORDER — FLUTICASONE PROPIONATE 50 MCG
SPRAY, SUSPENSION (ML) NASAL
COMMUNITY
End: 2021-09-15

## 2021-09-15 RX ORDER — SILDENAFIL 100 MG/1
TABLET, FILM COATED ORAL
COMMUNITY
End: 2021-09-15

## 2021-09-15 RX ORDER — LISINOPRIL AND HYDROCHLOROTHIAZIDE 12.5; 1 MG/1; MG/1
TABLET ORAL
COMMUNITY
End: 2021-09-15

## 2021-09-15 ASSESSMENT — ENCOUNTER SYMPTOMS
HEARTBURN: 0
CONSTITUTIONAL NEGATIVE: 1
BRUISES/BLEEDS EASILY: 0
CARDIOVASCULAR NEGATIVE: 1
EYES NEGATIVE: 1
MUSCULOSKELETAL NEGATIVE: 1
HEADACHES: 0
DEPRESSION: 0
NEUROLOGICAL NEGATIVE: 1
BLURRED VISION: 0
TINGLING: 0
RESPIRATORY NEGATIVE: 1
FEVER: 0
DOUBLE VISION: 0
PSYCHIATRIC NEGATIVE: 1
DIZZINESS: 0
MYALGIAS: 0
GASTROINTESTINAL NEGATIVE: 1
COUGH: 0
CHILLS: 0
NAUSEA: 0
PALPITATIONS: 0
HEMOPTYSIS: 0

## 2021-09-15 NOTE — PROGRESS NOTES
Subjective     Yannick López is a 40 y.o. male who presents with Follow-Up (6 month dm)            1. Controlled type 2 diabetes mellitus without complication, without long-term current use of insulin (HCC)  Currently treated for DM, taking meds and checking bs at home, trying to do DM diet.controlled    - POCT Hemoglobin A1C  - Comp Metabolic Panel; Future  - Hemoglobin A1c; Future  - Lipid Profile; Future  - REFERRAL TO OPHTHALMOLOGY    2. Obesity (BMI 35.0-39.9 without comorbidity) (HCC)  Patient has a diagnosis of obesity. They were counseled today on increasing exercise and  extensively counseled on a low carb diet       3. Essential hypertension  Currently treated for HTN, taking meds with no CP or sob, monitors bp at home periodically. controlled        4. Mixed hyperlipidemia  Currently treated for HLD, taking meds with no new myalgias or joint pain, watching fats in diet  controlled'    5. Need for vaccination    - PneumoVax PPV23 =>1yo    Past Medical History:  No date: Dental disorder      Comment:  caps  No date: Diabetes (HCC)      Comment:  7/26/21-Avg AM glucose=140, but goes down during the                day.   No date: GERD (gastroesophageal reflux disease)      Comment:  OTC prilosec PRN  No date: Heart burn      Comment:  OTC prilosec prn  No date: High cholesterol  No date: Hyperlipidemia  07/26/2021: Left foot pain      Comment:  healing S/P surgery  07/26/2021: Right wrist pain      Comment:  and hand  No date: Sleep apnea      Comment:  CPAP  No date: Snoring  Past Surgical History:  05/2021: FOOT ORIF; Left  2000: ACL RECONSTRUCTION; Left  Social History    Tobacco Use      Smoking status: Never Smoker      Smokeless tobacco: Former User        Types: Chew    Vaping Use      Vaping Use: Never used    Alcohol use: Yes      Comment: Maybe 1-2 drinks per week-beer or liquor    Drug use: Never    Review of patient's family history indicates:  Problem: Heart Disease      Relation:  "Father          Age of Onset: (Not Specified)  Problem: Hyperlipidemia      Relation: Father          Age of Onset: (Not Specified)      Current Outpatient Medications: •  pioglitazone (ACTOS) 45 MG Tab, Take 1 tablet by mouth every day., Disp: 90 tablet, Rfl: 0•  atorvastatin (LIPITOR) 10 MG Tab, Take 1 tablet by mouth every day., Disp: 90 tablet, Rfl: 0•  metformin (GLUCOPHAGE) 1000 MG tablet, Take 1 tablet by mouth 2 times a day. FOR 90 DAYS, Disp: 180 tablet, Rfl: 0•  omeprazole (PRILOSEC) 20 MG delayed-release capsule, TAKE 1 CAPSULE BY MOUTH EVERY DAY (Patient taking differently: Take 20 mg by mouth 1 time a day as needed.), Disp: 90 Cap, Rfl: 0    Patient was instructed on the use of medications, either prescriptions or OTC and informed on when the appropriate follow up time period should be. In addition, patient was also instructed that should any acute worsening occur that they should notify this clinic asap or call 911.          Review of Systems   Constitutional: Negative.  Negative for chills and fever.   HENT: Negative.  Negative for hearing loss.    Eyes: Negative.  Negative for blurred vision and double vision.   Respiratory: Negative.  Negative for cough and hemoptysis.    Cardiovascular: Negative.  Negative for chest pain and palpitations.   Gastrointestinal: Negative.  Negative for heartburn and nausea.   Genitourinary: Negative.  Negative for dysuria.   Musculoskeletal: Negative.  Negative for myalgias.   Skin: Negative.  Negative for rash.   Neurological: Negative.  Negative for dizziness, tingling and headaches.   Endo/Heme/Allergies: Negative.  Does not bruise/bleed easily.   Psychiatric/Behavioral: Negative.  Negative for depression and suicidal ideas.   All other systems reviewed and are negative.             Objective     /88 (BP Location: Left arm, Patient Position: Sitting, BP Cuff Size: Adult)   Pulse 87   Temp 36.3 °C (97.3 °F) (Temporal)   Resp 16   Ht 1.702 m (5' 7\")   Wt " 110 kg (243 lb 9.6 oz)   SpO2 94%   BMI 38.15 kg/m²      Physical Exam  Vitals and nursing note reviewed.   Constitutional:       General: He is not in acute distress.     Appearance: He is well-developed. He is not diaphoretic.   HENT:      Head: Normocephalic and atraumatic.      Mouth/Throat:      Pharynx: No oropharyngeal exudate.   Eyes:      Pupils: Pupils are equal, round, and reactive to light.   Cardiovascular:      Rate and Rhythm: Normal rate and regular rhythm.      Heart sounds: Normal heart sounds. No murmur heard.   No friction rub. No gallop.    Pulmonary:      Effort: Pulmonary effort is normal. No respiratory distress.      Breath sounds: Normal breath sounds. No wheezing or rales.   Chest:      Chest wall: No tenderness.   Neurological:      Mental Status: He is alert and oriented to person, place, and time.   Psychiatric:         Behavior: Behavior normal.         Thought Content: Thought content normal.         Judgment: Judgment normal.                             Assessment & Plan        1. Controlled type 2 diabetes mellitus without complication, without long-term current use of insulin ([facility])    - POCT Hemoglobin A1C  - Comp Metabolic Panel; Future  - Hemoglobin A1c; Future  - Lipid Profile; Future  - REFERRAL TO OPHTHALMOLOGY    2. Obesity (BMI 35.0-39.9 without comorbidity) ([facility])      3. Essential hypertension      4. Mixed hyperlipidemia      5. Need for vaccination  - PneumoVax PPV23 =>1yo

## 2021-09-16 ENCOUNTER — PRE-ADMISSION TESTING (OUTPATIENT)
Dept: ADMISSIONS | Facility: MEDICAL CENTER | Age: 41
End: 2021-09-16
Attending: ORTHOPAEDIC SURGERY
Payer: COMMERCIAL

## 2021-09-16 DIAGNOSIS — Z01.810 PRE-OPERATIVE CARDIOVASCULAR EXAMINATION: ICD-10-CM

## 2021-09-16 DIAGNOSIS — Z01.812 PRE-OPERATIVE LABORATORY EXAMINATION: ICD-10-CM

## 2021-09-16 LAB
ANION GAP SERPL CALC-SCNC: 12 MMOL/L (ref 7–16)
BUN SERPL-MCNC: 12 MG/DL (ref 8–22)
CALCIUM SERPL-MCNC: 9.2 MG/DL (ref 8.4–10.2)
CHLORIDE SERPL-SCNC: 102 MMOL/L (ref 96–112)
CO2 SERPL-SCNC: 24 MMOL/L (ref 20–33)
CREAT SERPL-MCNC: 0.82 MG/DL (ref 0.5–1.4)
EKG IMPRESSION: NORMAL
GLUCOSE SERPL-MCNC: 124 MG/DL (ref 65–99)
POTASSIUM SERPL-SCNC: 4.6 MMOL/L (ref 3.6–5.5)
SODIUM SERPL-SCNC: 138 MMOL/L (ref 135–145)

## 2021-09-16 PROCEDURE — 36415 COLL VENOUS BLD VENIPUNCTURE: CPT

## 2021-09-16 PROCEDURE — 93010 ELECTROCARDIOGRAM REPORT: CPT | Performed by: INTERNAL MEDICINE

## 2021-09-16 PROCEDURE — 80048 BASIC METABOLIC PNL TOTAL CA: CPT

## 2021-09-16 PROCEDURE — 93005 ELECTROCARDIOGRAM TRACING: CPT

## 2021-09-16 PROCEDURE — U0005 INFEC AGEN DETEC AMPLI PROBE: HCPCS

## 2021-09-16 PROCEDURE — U0003 INFECTIOUS AGENT DETECTION BY NUCLEIC ACID (DNA OR RNA); SEVERE ACUTE RESPIRATORY SYNDROME CORONAVIRUS 2 (SARS-COV-2) (CORONAVIRUS DISEASE [COVID-19]), AMPLIFIED PROBE TECHNIQUE, MAKING USE OF HIGH THROUGHPUT TECHNOLOGIES AS DESCRIBED BY CMS-2020-01-R: HCPCS

## 2021-09-16 PROCEDURE — C9803 HOPD COVID-19 SPEC COLLECT: HCPCS

## 2021-09-16 RX ORDER — IBUPROFEN 200 MG
200 TABLET ORAL EVERY 6 HOURS PRN
COMMUNITY

## 2021-09-16 RX ORDER — FINASTERIDE 1 MG/1
1 TABLET, FILM COATED ORAL DAILY
COMMUNITY

## 2021-09-16 NOTE — OR NURSING
"Preadmit appointment: \" Preparing for your Procedure information\" sheet given to patient with verbal and written instructions. Patient instructed to continue prescribed medications through the day before surgery, instructed to take the following medications the day of surgery per anesthesia protocol: LIPITOR           Verbal and written, and pre-admit video website instructions provided on covid symptoms to watch for given to patient, pt advised to notify MD if any symptoms develop. Verbal instructions given to follow the [place] mask mandate and encouraged to avoid crowds. Also verbally instructed to wear a mask when with person known not to have had the Covid vaccine.    STOP/BANG will bring CPAP DOS, protocol initiated. FALL RISK=7 protocol iniotiated.    Pt states he has watched the pre-admit video, provded verbal and written instructions with \"Preparing for Your Surgery\" brochure.    "

## 2021-09-17 LAB
SARS-COV-2 RNA RESP QL NAA+PROBE: NOTDETECTED
SPECIMEN SOURCE: NORMAL

## 2021-09-21 ENCOUNTER — ANESTHESIA EVENT (OUTPATIENT)
Dept: SURGERY | Facility: MEDICAL CENTER | Age: 41
End: 2021-09-21
Payer: COMMERCIAL

## 2021-09-21 ENCOUNTER — HOSPITAL ENCOUNTER (OUTPATIENT)
Facility: MEDICAL CENTER | Age: 41
End: 2021-09-21
Attending: ORTHOPAEDIC SURGERY | Admitting: ORTHOPAEDIC SURGERY
Payer: COMMERCIAL

## 2021-09-21 ENCOUNTER — ANESTHESIA (OUTPATIENT)
Dept: SURGERY | Facility: MEDICAL CENTER | Age: 41
End: 2021-09-21
Payer: COMMERCIAL

## 2021-09-21 VITALS
SYSTOLIC BLOOD PRESSURE: 131 MMHG | WEIGHT: 243.61 LBS | OXYGEN SATURATION: 91 % | DIASTOLIC BLOOD PRESSURE: 74 MMHG | TEMPERATURE: 97.3 F | BODY MASS INDEX: 38.24 KG/M2 | HEART RATE: 76 BPM | HEIGHT: 67 IN | RESPIRATION RATE: 16 BRPM

## 2021-09-21 DIAGNOSIS — M65.4 DE QUERVAIN'S TENOSYNOVITIS, RIGHT: ICD-10-CM

## 2021-09-21 DIAGNOSIS — G89.18 POST-OPERATIVE PAIN: ICD-10-CM

## 2021-09-21 LAB — GLUCOSE BLD-MCNC: 91 MG/DL (ref 65–99)

## 2021-09-21 PROCEDURE — 25000 INCISION OF TENDON SHEATH: CPT | Mod: RT | Performed by: ORTHOPAEDIC SURGERY

## 2021-09-21 PROCEDURE — 160009 HCHG ANES TIME/MIN: Performed by: ORTHOPAEDIC SURGERY

## 2021-09-21 PROCEDURE — 160025 RECOVERY II MINUTES (STATS): Performed by: ORTHOPAEDIC SURGERY

## 2021-09-21 PROCEDURE — 160002 HCHG RECOVERY MINUTES (STAT): Performed by: ORTHOPAEDIC SURGERY

## 2021-09-21 PROCEDURE — 700105 HCHG RX REV CODE 258: Performed by: ORTHOPAEDIC SURGERY

## 2021-09-21 PROCEDURE — 160036 HCHG PACU - EA ADDL 30 MINS PHASE I: Performed by: ORTHOPAEDIC SURGERY

## 2021-09-21 PROCEDURE — 700101 HCHG RX REV CODE 250: Performed by: ANESTHESIOLOGY

## 2021-09-21 PROCEDURE — 160046 HCHG PACU - 1ST 60 MINS PHASE II: Performed by: ORTHOPAEDIC SURGERY

## 2021-09-21 PROCEDURE — 700111 HCHG RX REV CODE 636 W/ 250 OVERRIDE (IP): Performed by: ANESTHESIOLOGY

## 2021-09-21 PROCEDURE — 502576 HCHG PACK, HAND: Performed by: ORTHOPAEDIC SURGERY

## 2021-09-21 PROCEDURE — 160028 HCHG SURGERY MINUTES - 1ST 30 MINS LEVEL 3: Performed by: ORTHOPAEDIC SURGERY

## 2021-09-21 PROCEDURE — 160035 HCHG PACU - 1ST 60 MINS PHASE I: Performed by: ORTHOPAEDIC SURGERY

## 2021-09-21 PROCEDURE — 82962 GLUCOSE BLOOD TEST: CPT

## 2021-09-21 PROCEDURE — 700101 HCHG RX REV CODE 250: Performed by: ORTHOPAEDIC SURGERY

## 2021-09-21 PROCEDURE — 160048 HCHG OR STATISTICAL LEVEL 1-5: Performed by: ORTHOPAEDIC SURGERY

## 2021-09-21 RX ORDER — ONDANSETRON 2 MG/ML
INJECTION INTRAMUSCULAR; INTRAVENOUS PRN
Status: DISCONTINUED | OUTPATIENT
Start: 2021-09-21 | End: 2021-09-21 | Stop reason: SURG

## 2021-09-21 RX ORDER — SODIUM CHLORIDE, SODIUM LACTATE, POTASSIUM CHLORIDE, CALCIUM CHLORIDE 600; 310; 30; 20 MG/100ML; MG/100ML; MG/100ML; MG/100ML
INJECTION, SOLUTION INTRAVENOUS CONTINUOUS
Status: DISCONTINUED | OUTPATIENT
Start: 2021-09-21 | End: 2021-09-21

## 2021-09-21 RX ORDER — DIPHENHYDRAMINE HYDROCHLORIDE 50 MG/ML
12.5 INJECTION INTRAMUSCULAR; INTRAVENOUS
Status: DISCONTINUED | OUTPATIENT
Start: 2021-09-21 | End: 2021-09-21 | Stop reason: HOSPADM

## 2021-09-21 RX ORDER — HALOPERIDOL 5 MG/ML
1 INJECTION INTRAMUSCULAR
Status: DISCONTINUED | OUTPATIENT
Start: 2021-09-21 | End: 2021-09-21 | Stop reason: HOSPADM

## 2021-09-21 RX ORDER — BUPIVACAINE HYDROCHLORIDE 5 MG/ML
INJECTION, SOLUTION EPIDURAL; INTRACAUDAL
Status: DISCONTINUED | OUTPATIENT
Start: 2021-09-21 | End: 2021-09-21 | Stop reason: HOSPADM

## 2021-09-21 RX ORDER — HYDROMORPHONE HYDROCHLORIDE 1 MG/ML
0.5 INJECTION, SOLUTION INTRAMUSCULAR; INTRAVENOUS; SUBCUTANEOUS
Status: DISCONTINUED | OUTPATIENT
Start: 2021-09-21 | End: 2021-09-21 | Stop reason: HOSPADM

## 2021-09-21 RX ORDER — ONDANSETRON 2 MG/ML
4 INJECTION INTRAMUSCULAR; INTRAVENOUS
Status: DISCONTINUED | OUTPATIENT
Start: 2021-09-21 | End: 2021-09-21 | Stop reason: HOSPADM

## 2021-09-21 RX ORDER — HYDROMORPHONE HYDROCHLORIDE 1 MG/ML
0.2 INJECTION, SOLUTION INTRAMUSCULAR; INTRAVENOUS; SUBCUTANEOUS
Status: DISCONTINUED | OUTPATIENT
Start: 2021-09-21 | End: 2021-09-21 | Stop reason: HOSPADM

## 2021-09-21 RX ORDER — MIDAZOLAM HYDROCHLORIDE 1 MG/ML
INJECTION INTRAMUSCULAR; INTRAVENOUS PRN
Status: DISCONTINUED | OUTPATIENT
Start: 2021-09-21 | End: 2021-09-21 | Stop reason: SURG

## 2021-09-21 RX ORDER — DEXAMETHASONE SODIUM PHOSPHATE 4 MG/ML
INJECTION, SOLUTION INTRA-ARTICULAR; INTRALESIONAL; INTRAMUSCULAR; INTRAVENOUS; SOFT TISSUE PRN
Status: DISCONTINUED | OUTPATIENT
Start: 2021-09-21 | End: 2021-09-21 | Stop reason: SURG

## 2021-09-21 RX ORDER — TRAMADOL HYDROCHLORIDE 50 MG/1
50 TABLET ORAL EVERY 6 HOURS PRN
Qty: 15 TABLET | Refills: 0 | Status: SHIPPED | OUTPATIENT
Start: 2021-09-21 | End: 2021-09-26

## 2021-09-21 RX ORDER — OXYCODONE HCL 5 MG/5 ML
10 SOLUTION, ORAL ORAL
Status: DISCONTINUED | OUTPATIENT
Start: 2021-09-21 | End: 2021-09-21 | Stop reason: HOSPADM

## 2021-09-21 RX ORDER — KETOROLAC TROMETHAMINE 30 MG/ML
INJECTION, SOLUTION INTRAMUSCULAR; INTRAVENOUS PRN
Status: DISCONTINUED | OUTPATIENT
Start: 2021-09-21 | End: 2021-09-21 | Stop reason: SURG

## 2021-09-21 RX ORDER — HYDRALAZINE HYDROCHLORIDE 20 MG/ML
5 INJECTION INTRAMUSCULAR; INTRAVENOUS
Status: DISCONTINUED | OUTPATIENT
Start: 2021-09-21 | End: 2021-09-21 | Stop reason: HOSPADM

## 2021-09-21 RX ORDER — OXYCODONE HCL 5 MG/5 ML
5 SOLUTION, ORAL ORAL
Status: DISCONTINUED | OUTPATIENT
Start: 2021-09-21 | End: 2021-09-21 | Stop reason: HOSPADM

## 2021-09-21 RX ORDER — CEFAZOLIN SODIUM 1 G/3ML
INJECTION, POWDER, FOR SOLUTION INTRAMUSCULAR; INTRAVENOUS PRN
Status: DISCONTINUED | OUTPATIENT
Start: 2021-09-21 | End: 2021-09-21 | Stop reason: SURG

## 2021-09-21 RX ORDER — SODIUM CHLORIDE, SODIUM LACTATE, POTASSIUM CHLORIDE, CALCIUM CHLORIDE 600; 310; 30; 20 MG/100ML; MG/100ML; MG/100ML; MG/100ML
INJECTION, SOLUTION INTRAVENOUS CONTINUOUS
Status: DISCONTINUED | OUTPATIENT
Start: 2021-09-21 | End: 2021-09-21 | Stop reason: HOSPADM

## 2021-09-21 RX ORDER — LIDOCAINE HYDROCHLORIDE 20 MG/ML
INJECTION, SOLUTION EPIDURAL; INFILTRATION; INTRACAUDAL; PERINEURAL PRN
Status: DISCONTINUED | OUTPATIENT
Start: 2021-09-21 | End: 2021-09-21 | Stop reason: SURG

## 2021-09-21 RX ORDER — CEFAZOLIN SODIUM IN 0.9 % NACL 2 G/100 ML
2 PLASTIC BAG, INJECTION (ML) INTRAVENOUS ONCE
Status: DISCONTINUED | OUTPATIENT
Start: 2021-09-21 | End: 2021-09-21 | Stop reason: HOSPADM

## 2021-09-21 RX ORDER — MEPERIDINE HYDROCHLORIDE 25 MG/ML
12.5 INJECTION INTRAMUSCULAR; INTRAVENOUS; SUBCUTANEOUS
Status: DISCONTINUED | OUTPATIENT
Start: 2021-09-21 | End: 2021-09-21 | Stop reason: HOSPADM

## 2021-09-21 RX ORDER — HYDROMORPHONE HYDROCHLORIDE 1 MG/ML
0.4 INJECTION, SOLUTION INTRAMUSCULAR; INTRAVENOUS; SUBCUTANEOUS
Status: DISCONTINUED | OUTPATIENT
Start: 2021-09-21 | End: 2021-09-21 | Stop reason: HOSPADM

## 2021-09-21 RX ADMIN — FENTANYL CITRATE 50 MCG: 50 INJECTION, SOLUTION INTRAMUSCULAR; INTRAVENOUS at 15:05

## 2021-09-21 RX ADMIN — SODIUM CHLORIDE, POTASSIUM CHLORIDE, SODIUM LACTATE AND CALCIUM CHLORIDE: 600; 310; 30; 20 INJECTION, SOLUTION INTRAVENOUS at 14:57

## 2021-09-21 RX ADMIN — PROPOFOL 200 MG: 10 INJECTION, EMULSION INTRAVENOUS at 15:00

## 2021-09-21 RX ADMIN — LIDOCAINE HYDROCHLORIDE 100 MG: 20 INJECTION, SOLUTION EPIDURAL; INFILTRATION; INTRACAUDAL; PERINEURAL at 15:00

## 2021-09-21 RX ADMIN — MIDAZOLAM HYDROCHLORIDE 2 MG: 1 INJECTION, SOLUTION INTRAMUSCULAR; INTRAVENOUS at 14:57

## 2021-09-21 RX ADMIN — DEXAMETHASONE SODIUM PHOSPHATE 4 MG: 4 INJECTION, SOLUTION INTRAMUSCULAR; INTRAVENOUS at 15:05

## 2021-09-21 RX ADMIN — CEFAZOLIN 2 G: 330 INJECTION, POWDER, FOR SOLUTION INTRAMUSCULAR; INTRAVENOUS at 14:57

## 2021-09-21 RX ADMIN — KETOROLAC TROMETHAMINE 30 MG: 30 INJECTION, SOLUTION INTRAMUSCULAR at 15:13

## 2021-09-21 RX ADMIN — ONDANSETRON 4 MG: 2 INJECTION INTRAMUSCULAR; INTRAVENOUS at 15:13

## 2021-09-21 RX ADMIN — FENTANYL CITRATE 100 MCG: 50 INJECTION, SOLUTION INTRAMUSCULAR; INTRAVENOUS at 14:57

## 2021-09-21 ASSESSMENT — PAIN SCALES - GENERAL: PAIN_LEVEL: 0

## 2021-09-21 ASSESSMENT — PAIN DESCRIPTION - PAIN TYPE: TYPE: SURGICAL PAIN

## 2021-09-21 NOTE — DISCHARGE INSTRUCTIONS
ACTIVITY: Rest and take it easy for the first 24 hours.  A responsible adult is recommended to remain with you during that time.  It is normal to feel sleepy.  We encourage you to not do anything that requires balance, judgment or coordination.    MILD FLU-LIKE SYMPTOMS ARE NORMAL. YOU MAY EXPERIENCE GENERALIZED MUSCLE ACHES, THROAT IRRITATION, HEADACHE AND/OR SOME NAUSEA.    FOR 24 HOURS DO NOT:  Drive, operate machinery or run household appliances.  Drink beer or alcoholic beverages.   Make important decisions or sign legal documents.    SPECIAL INSTRUCTIONS:   Keep dressing clean and dry   Elevate extremity   May remove dressing 5-6 days and shower   Encourage moving all fingers    See 1st or 2nd page of this packet for further detail    DIET: To avoid nausea, slowly advance diet as tolerated, avoiding spicy or greasy foods for the first day.  Add more substantial food to your diet according to your physician's instructions.  INCREASE FLUIDS AND FIBER TO AVOID CONSTIPATION.      FOLLOW-UP APPOINTMENT:  A follow-up appointment should be arranged with your doctor; call to schedule.    You should CALL YOUR PHYSICIAN if you develop:  Fever greater than 101 degrees F.  Pain not relieved by medication, or persistent nausea or vomiting.  Excessive bleeding (blood soaking through dressing) or unexpected drainage from the wound.  Extreme redness or swelling around the incision site, drainage of pus or foul smelling drainage.  Inability to urinate or empty your bladder within 8 hours.    You should call 911 if you develop problems with breathing or chest pain.    If you are unable to contact your doctor or surgical center, you should go to the nearest emergency room or urgent care center.      Physician's telephone #: Dr. Davis [phone]    If any questions arise, call your doctor.  If your doctor is not available, please feel free to call the Surgical Center at [phone]. The Contact Center is open Monday  through Friday 7AM to 5PM and may speak to a nurse at [phone], or toll free at [phone].     A registered nurse may call you a few days after your surgery to see how you are doing after your procedure.    MEDICATIONS: Resume taking daily medication.  Take prescribed pain medication with food.  If no medication is prescribed, you may take non-aspirin pain medication if needed.  PAIN MEDICATION CAN BE VERY CONSTIPATING.  Take a stool softener or laxative such as senokot, pericolace, or milk of magnesia if needed.    Prescription given for Tramadol.      Last pain medication given: None    If your physician has prescribed pain medication that includes Acetaminophen (Tylenol), do not take additional Acetaminophen (Tylenol) while taking the prescribed medication.    Depression / Suicide Risk    As you are discharged from this [facility] facility, it is important to learn how to keep safe from harming yourself.    Recognize the warning signs:  · Abrupt changes in personality, positive or negative- including increase in energy   · Giving away possessions  · Change in eating patterns- significant weight changes-  positive or negative  · Change in sleeping patterns- unable to sleep or sleeping all the time   · Unwillingness or inability to communicate  · Depression  · Unusual sadness, discouragement and loneliness  · Talk of wanting to die  · Neglect of personal appearance   · Rebelliousness- reckless behavior  · Withdrawal from people/activities they love  · Confusion- inability to concentrate     If you or a loved one observes any of these behaviors or has concerns about self-harm, here's what you can do:  · Talk about it- your feelings and reasons for harming yourself  · Remove any means that you might use to hurt yourself (examples: pills, rope, extension cords, firearm)  · Get professional help from the community (Mental Health, Substance Abuse, psychological counseling)  · Do not be alone:Call your Safe  Contact- someone whom you trust who will be there for you.  · Call your local CRISIS HOTLINE 759-3258 or 423-597-3608  · Call your local Children's Mobile Crisis Response Team [place] (101) 773-5049 or www.[employer]  · Call the toll free National Suicide Prevention Hotlines   · National Suicide Prevention Lifeline 455-343-QZQF (4514)  [facility] Line Network 800-SUICIDE (959-8787)    Discharge Education for patients on MATHEW (Obstructive Sleep Apnea) Protocol    Prior to receiving sedation or anesthesia, we screen all patients for Obstructive Sleep Apnea.  During your screening, you were identified as having Obstructive Sleep Apnea(MATHEW).    What is Obstructive Sleep Apnea?  Sleep apnea (AP-ne-ah) is a common disorder which involves breathing pauses that occur during sleep.  These can last from 10 seconds to a minute or longer.  Normal breathing resumes often with a loud snort or choking sound.    Sleep apnea occurs in all age groups and both genders but is more common in men and people over 40 years of age.  It has been estimated that as many as 18 million Americans have sleep apnea.  Most people who have sleep apnea don’t know they have it because it only occurs during sleep.  A family member and/or bed partner may first notice the signs of sleep apnea.  Sleep apnea is a chronic (ongoing) condition that disrupts the quality and quantity of your sleep repeatedly throughout the night.  This often results in excessive daytime sleepiness or fatigue during the day.  It may also contribute to high blood pressure, heart problems, and complications following medications used for surgery and procedures.      We recommend that you should be with an adult observer for at least 24 hours after your sedation/anesthesia.  If you have a CPAP machine, you should wear it during any sleep period (day or night) for the week following your procedure.  We encourage you to sleep on your side or in a sitting position, even  with napping.  Lying flat on your back increases the risk of apnea and airway obstruction during your post procedure recovery period.    It is important to prevent over-sedation that could increase your risk for apnea.  Please take all pain medication as directed by your physician.  If you are not getting pain relief, please contact your physician to discuss possible approaches to relieving pain while minimizing medications that can affect your breathing and oxygen levels.

## 2021-09-21 NOTE — ANESTHESIA TIME REPORT
Anesthesia Start and Stop Event Times     Date Time Event    9/21/2021 1452 Ready for Procedure     1457 Anesthesia Start     1521 Anesthesia Stop        Responsible Staff  09/21/21    Name Role Begin End    Kaushik Barrera M.D. Anesth 1457 1521        Preop Diagnosis (Free Text):  Pre-op Diagnosis     De Quervain's tenosynovitis, right [M65.4]        Preop Diagnosis (Codes):  Diagnosis Information     Diagnosis Code(s): De Quervain's tenosynovitis, right [M65.4]        Post op Diagnosis  De Quervain's disease (tenosynovitis)  Right wrist De Quervain's    Premium Reason  C. Post-1st,2nd,3rd,OB,RTC    Comments:

## 2021-09-21 NOTE — OR SURGEON
Immediate Post OP Note    PreOp Diagnosis: R Dequervain      PostOp Diagnosis: same      Procedure(s):  RELEASE, HAND, FOR DEQUERVAIN'S TENOSYNOVITIS - Wound Class: Clean    Surgeon(s):  Say Davis M.D.    Anesthesiologist/Type of Anesthesia:  Anesthesiologist: Oleksandr Pierre M.D./General    Surgical Staff:  Circulator: Heather C Cogan, R.N.; Dixie Gonzalez R.N.  Scrub Person: Nisha Mike    Specimens removed if any:  * No specimens in log *    Estimated Blood Loss: min    Findings: adequate release    Complications: none        9/21/2021 2:53 PM Say Davis M.D.

## 2021-09-21 NOTE — LETTER
July 27, 2021    [facility]  [address] [place] [address]  [phone]    Patient Name: Yannick López  Surgeon Name: Surgeon(s):  Say Foss M.D.  [facility] [facility]  Surgery Date: 8/17/2021    The time of your surgery is not final and may change up to and until the day of your surgery. You will be contacted 24-48 hours prior to your surgery date with your check-in and surgery time.    If you will not be at one of the below numbers please call/text the surgery scheduler at   Cell Phone: [phone]    BEFORE YOUR SURGERY  Pre Registration and/or Lab Work must be done within and no earlier than 28 days prior to your surgery date. Please call [phone] for an appointment as soon as possible.    Please refrain from smoking any substance after midnight prior to surgery. Smoking may interfere with the anesthetic and frequently produces nausea during the recovery period.    Continue taking all lifesaving medications. Including the morning of your surgery with small sip of water.    Please read the MEDICATION INSTRUCTIONS below completely.    DAY OF YOUR SURGERY  Nothing to eat or drink after midnight     Please arrive at the hospital/surgery center at the check-in time provided.     An adult will need to bring you and take you home after your surgery.     AFTER YOUR SURGERY  Post op Appointment:   Date: 08/25/21   Time: 10AM   With: WES FOSS WILL BE OUT OF THE OFFICE   Location: [address] [facility] [phone]        TIME OFF WORK  FMLA or Disability forms can be faxed directly to: [phone] or you may drop them off at [address] [facility] [phone]. Our office charges a $20.00 fee per form. Forms will be completed within 10 business days of drop off and payment received. For the status of your forms you may contact our disability office directly [phone].    MEDICATION INSTRUCTIONS  The following medications should be stopped a minimum of 10 days prior to surgery:  All over the counter, Supplements & Herbal medications    Anorectics: Phentermine (Adipex-P, Lomaira and Suprenza), Phentermine-topiramate (Qsymia), Bupropion-naltrexone (Contrave)    Opiod Partial Agonists/Opioid Antagonists: Buprenorphine (Subocone, Belbuca, Butrans, Probuphine Implant, Sublocade), Naltrexone (ReVia, Vivitrol), Naloxone    Amphetamines: Dextroamphetamine/Amphetamine (Adderall, Mydayis), Methylphenidate Hydrochloride (Concerta, Metadate, Methylin, Ritalin)    The following medications should be stopped 5 days prior to surgery:  Blood Thinners: Any Aspirin, Aspirin products, anti-inflammatories such as ibuprofen and any blood thinners such as Coumadin and Plavix. Please consult your prescribing physician if you are on life saving blood thinners, in regards to when to stop medications prior to surgery.     The following medications should be stopped a minimum of 3 days prior to surgery:  PDE-5 inhibitors: Sildenafil (Viagra), Tadalafil (Cialis), Vardenafil (Levitra), Avanafil (Stendra)    MAO Inhibitors: Rasagiline (Azilect), Selegiline (Eldepryl, Emsam, Selapar), Isocarboxazid (Marplan), Phenelzine (Nardil)

## 2021-09-21 NOTE — LETTER
August 11, 2021    [facility]  [address] [place] [address]  [phone]    Patient Name: Yannick López  Surgeon Name: Surgeon(s):  Say Foss M.D.  [facility] [facility]  Surgery Date: 9/21/2021    The time of your surgery is not final and may change up to and until the day of your surgery. You will be contacted 24-48 hours prior to your surgery date with your check-in and surgery time.    If you will not be at one of the below numbers please call/text the surgery scheduler at   Cell Phone: [phone]    BEFORE YOUR SURGERY  Pre Registration and/or Lab Work must be done within and no earlier than 28 days prior to your surgery date. Please call [phone] for an appointment as soon as possible.    Please refrain from smoking any substance after midnight prior to surgery. Smoking may interfere with the anesthetic and frequently produces nausea during the recovery period.    Continue taking all lifesaving medications. Including the morning of your surgery with small sip of water.    Please read the MEDICATION INSTRUCTIONS below completely.    DAY OF YOUR SURGERY  Nothing to eat or drink after midnight     Please arrive at the hospital/surgery center at the check-in time provided.     An adult will need to bring you and take you home after your surgery.     AFTER YOUR SURGERY  Post op Appointment:   Date: 10/04/21   Time: 1030am   With: DR FOSS    Location: [address] [facility] [phone]    TIME OFF WORK  FMLA or Disability forms can be faxed directly to: [phone] or you may drop them off at [address] [facility] [phone]. Our office charges a $20.00 fee per form. Forms will be completed within 10 business days of drop off and payment received. For the status of your forms you may contact our disability office directly [phone].    MEDICATION INSTRUCTIONS  The following  medications should be stopped a minimum of 10 days prior to surgery:  All over the counter, Supplements & Herbal medications    Anorectics: Phentermine (Adipex-P, Lomaira and Suprenza), Phentermine-topiramate (Qsymia), Bupropion-naltrexone (Contrave)    Opiod Partial Agonists/Opioid Antagonists: Buprenorphine (Subocone, Belbuca, Butrans, Probuphine Implant, Sublocade), Naltrexone (ReVia, Vivitrol), Naloxone    Amphetamines: Dextroamphetamine/Amphetamine (Adderall, Mydayis), Methylphenidate Hydrochloride (Concerta, Metadate, Methylin, Ritalin)    The following medications should be stopped 5 days prior to surgery:  Blood Thinners: Any Aspirin, Aspirin products, anti-inflammatories such as ibuprofen and any blood thinners such as Coumadin and Plavix. Please consult your prescribing physician if you are on life saving blood thinners, in regards to when to stop medications prior to surgery.     The following medications should be stopped a minimum of 3 days prior to surgery:  PDE-5 inhibitors: Sildenafil (Viagra), Tadalafil (Cialis), Vardenafil (Levitra), Avanafil (Stendra)    MAO Inhibitors: Rasagiline (Azilect), Selegiline (Eldepryl, Emsam, Selapar), Isocarboxazid (Marplan), Phenelzine (Nardil)

## 2021-09-21 NOTE — ANESTHESIA PREPROCEDURE EVALUATION
MATHEW (CPAP), oral med for DM type 2. Denies: MI/CHF/smoking/CVA/CKD      Relevant Problems   CARDIAC   (positive) Essential hypertension      ENDO   (positive) Diabetes mellitus type II, controlled (HCC)       Physical Exam    Airway   Mallampati: II  TM distance: >3 FB  Neck ROM: full       Cardiovascular - normal exam  Rhythm: regular  Rate: normal  (-) murmur     Dental - normal exam           Pulmonary - normal exam  Breath sounds clear to auscultation     Abdominal    Neurological - normal exam                 Anesthesia Plan    ASA 2       Plan - general       Airway plan will be LMA          Induction: intravenous    Postoperative Plan: Postoperative administration of opioids is intended.    Pertinent diagnostic labs and testing reviewed    Informed Consent:    Anesthetic plan and risks discussed with patient.    Use of blood products discussed with: patient whom consented to blood products.

## 2021-09-21 NOTE — ANESTHESIA PROCEDURE NOTES
Airway    Date/Time: 9/21/2021 3:00 PM  Performed by: Kaushik Barrera M.D.  Authorized by: Kaushik Barrera M.D.     Location:  OR  Urgency:  Elective  Indications for Airway Management:  Anesthesia      Spontaneous Ventilation: absent    Sedation Level:  Deep  Preoxygenated: Yes    Final Airway Type:  Supraglottic airway  Final Supraglottic Airway:  Standard LMA    SGA Size:  4  Number of Attempts at Approach:  1   Atraumatic insertion, good seal

## 2021-09-21 NOTE — ANESTHESIA POSTPROCEDURE EVALUATION
Patient: Yannick López    Procedure Summary     Date: 09/21/21 Room / Location:  OR 02 / SURGERY [facility]    Anesthesia Start: 1457 Anesthesia Stop: 1521    Procedure: RELEASE, HAND, FOR DEQUERVAIN'S TENOSYNOVITIS (Right Wrist) Diagnosis:       De Quervain's tenosynovitis, right      (De Quervain's tenosynovitis, right [M65.4])    Surgeons: Say Davis M.D. Responsible Provider: Kaushik Barrera M.D.    Anesthesia Type: general ASA Status: 2          Final Anesthesia Type: general  Last vitals  BP   Blood Pressure: 131/74    Temp   36.1 °C (97 °F)    Pulse   82   Resp   18    SpO2   90 %      Anesthesia Post Evaluation    Patient location during evaluation: PACU  Patient participation: complete - patient participated  Level of consciousness: awake and alert  Pain score: 0    Airway patency: patent  Anesthetic complications: no  Cardiovascular status: hemodynamically stable  Respiratory status: acceptable  Hydration status: euvolemic    PONV: none          No complications documented.     Nurse Pain Score: 0 (NPRS)

## 2021-09-21 NOTE — OR NURSING
1519: To PACU post right hand dequervain's release. OPA in place. Fingers are pink and warm w/ brisk cap refill.  1550: Pt remains sleeping w/ OPA in place.  94904: OPA dc'd, breathing is spontaneous and unlabored. Able to sense touch to fingers and is able to move them. Denies pain or nausea.  1625: Meets criteria for stage ll.

## 2021-09-21 NOTE — LETTER
July 21, 2021    [facility]  [address] [place] [address]  [phone]    Patient Name: Yannick López  Surgeon Name: Surgeon(s):  Say Foss M.D.  [facility] [facility]  Surgery Date: 8/3/2021    The time of your surgery is not final and may change up to and until the day of your surgery. You will be contacted 24-48 hours prior to your surgery date with your check-in and surgery time.    If you will not be at one of the below numbers please call/text the surgery scheduler at [phone]  Cell Phone: [phone]    BEFORE YOUR SURGERY  Pre Registration and/or Lab Work must be done within and no earlier than 28 days prior to your surgery date. Please call [phone] for an appointment as soon as possible.    Please refrain from smoking any substance after midnight prior to surgery. Smoking may interfere with the anesthetic and frequently produces nausea during the recovery period.    Continue taking all lifesaving medications. Including the morning of your surgery with small sip of water.    Please read the MEDICATION INSTRUCTIONS below completely.    DAY OF YOUR SURGERY  Nothing to eat or drink after midnight     Please arrive at the hospital/surgery center at the check-in time provided.     An adult will need to bring you and take you home after your surgery.     AFTER YOUR SURGERY  Post op Appointment:   Date: 08/16/21   Time: 11AM   With: DR FOSS    Location: [address] [facility] [phone]    TIME OFF WORK  FMLA or Disability forms can be faxed directly to: [phone] or you may drop them off at [address] [facility] [phone]. Our office charges a $20.00 fee per form. Forms will be completed within 10 business days of drop off and payment received. For the status of your forms you may contact our disability office directly [phone].    MEDICATION INSTRUCTIONS  The  following medications should be stopped a minimum of 10 days prior to surgery:  All over the counter, Supplements & Herbal medications    Anorectics: Phentermine (Adipex-P, Lomaira and Suprenza), Phentermine-topiramate (Qsymia), Bupropion-naltrexone (Contrave)    Opiod Partial Agonists/Opioid Antagonists: Buprenorphine (Subocone, Belbuca, Butrans, Probuphine Implant, Sublocade), Naltrexone (ReVia, Vivitrol), Naloxone    Amphetamines: Dextroamphetamine/Amphetamine (Adderall, Mydayis), Methylphenidate Hydrochloride (Concerta, Metadate, Methylin, Ritalin)    The following medications should be stopped 5 days prior to surgery:  Blood Thinners: Any Aspirin, Aspirin products, anti-inflammatories such as ibuprofen and any blood thinners such as Coumadin and Plavix. Please consult your prescribing physician if you are on life saving blood thinners, in regards to when to stop medications prior to surgery.     The following medications should be stopped a minimum of 3 days prior to surgery:  PDE-5 inhibitors: Sildenafil (Viagra), Tadalafil (Cialis), Vardenafil (Levitra), Avanafil (Stendra)    MAO Inhibitors: Rasagiline (Azilect), Selegiline (Eldepryl, Emsam, Selapar), Isocarboxazid (Marplan), Phenelzine (Nardil)

## 2021-09-21 NOTE — DISCHARGE INSTR - OTHER INFO
{[facility] [place] instructions:[id number]}                                                                                                            DR. FOSS POST-OPERATIVE INSTRUCTIONS    You have just undergone a sugery by Dr. Foss in the operating room.  It is our wish that your postoperative recovery be as quick and comfortable as possible.  Please carefully review the following items that are important for your recovery.    After any operation, a certain degree of pain is to be expected. Take Advil (ibuprofen) and Extra Strength Tylenol as first line medications for mild to moderate pain. Taking each one every 6 hours, and staggering them so that you are taking one medicine every 3 hours, is the most effective. Refer to dosing instructions on the bottle, but in general ibuprofen dose is 600-800mg and Tylenol dose is 500-1000mg. For most small procedures, this should be enough to keep you comfortable. Take these medications until your followup visit. You may have been given a small prescription for stronger pain medicine which will help relieve more severe pain.  Pain medicine may make you drowsy so please keep this in mind.  Do not drive while taking pain medicine.      When you go home, please keep your operated arm elevated at all times (above the level of your heart).  If you do this, your swelling will resolve more quickly and your pain will be improve more quickly as well. You may also place an ice pack over your dressing or splint to help with swelling and pain.    It is also very important to keep your fingers moving after most procedures, unless you had a tendon repair or fracture repair in which case you will be in a splint. Keep all of your fingers moving through a full range of motion to prevent stiffness.    For small hand procedures such as carpal tunnel release, trigger finger release, and cyst excision, the dressing that you have on your extremity should remain on for 3-4 days. It may  then be removed, you can wash the incision gently with soap and water, and keep the incision covered with a band-aid or similar clean dressing. For larger procedures or if you have a splint on, these should remain on until follow up or as specifically instructed. If you feel that your dressing is too tight during the first 3 days after surgery, you may loosen it. It is normal to see minor staining on the dressing after surgery. If there is significant bleeding, you are advised to call the office during regular office hours to have this checked.  Make sure that your dressing is kept dry at all times.  You can take a shower if you cover your arm with a plastic bag. If your dressing gets wet, replace it with sterile dressing that you can obtain from your local drug store.    Follow-up appointments can usually be found on the pre-op paperwork if not please call our office for a follow-up appointment, [phone]. Follow up after surgery is typically 10-14 days, unless you were specifically instructed otherwise. The sutures will be removed and you may be asked to see a hand therapist to optimize your functional result. Each of the hand therapists that you will be referred to have received special training in the care of the hand and upper extremity.    If you have questions regarding your surgery postop that you feel requires attention, please call the office at [phone] during business hours, or [phone] after hours for the answering service. If you feel that you have a surgical emergency postoperatively that requires immediate attention, please call the above numbers or go to the Emergency Department and ask for the Orthopedic Surgeon on call.

## 2021-09-22 NOTE — OP REPORT
DATE OF SERVICE:  09/21/2021     PREOPERATIVE DIAGNOSIS:  Right wrist de Quervain's.     POSTOPERATIVE DIAGNOSIS:  Right wrist de Quervain's.     PROCEDURE:  Right wrist de Quervain's release.     SURGEON:  Say Davis MD     ANESTHESIA:  General.     ESTIMATED BLOOD LOSS:  Minimal.     DRAINS:  None.     COMPLICATIONS:  None.     INDICATIONS:  The patient is a gentleman who has ongoing symptoms of de   Quervain's, felt to benefit from the above-mentioned surgery.  Risks,   benefits, complications and alternatives were explained to the patient.  All   questions were answered.  No guarantees were given.  Signed consent was   obtained.     DESCRIPTION OF PROCEDURE:  The patient was taken to the operating room, laid   supine on the table.  All bony prominences well padded.  Adequate general was   obtained without difficulty.  Right upper extremity was prepped and draped in   the usual sterile fashion using ChloraPrep.  Limb was exsanguinated with   elevation.  Tourniquet was raised.  Total tourniquet time was under 20   minutes.  A transverse incision was made over the first dorsal compartment   through skin and subcutaneous tissue, bluntly dissected.  The first dorsal   compartment was opened proximally and distally, found to be a nice release   along its entirety.  There was quite a bit of synovitis.  The EPB tendon was   freed along its entirety.  The wound was irrigated.  I placed 2 loose sutures   in the retinaculum to prevent tendon subluxation.  The tendons tracked nicely.    Subcutaneous was closed with Vicryl, skin with nylon.  Local without   epinephrine was injected.  Sterile dressing of Xeroform, 4x4's, Sof-Rol, bias   was applied.  All needle, sponge counts were correct.  The patient tolerated   the procedure well and was transferred to recovery room in good condition.        ______________________________  MD AMADOU TO/KEMAR    DD:  09/21/2021 21:10  DT:  09/21/2021 21:44    Job#:   [id number]

## 2021-10-05 ENCOUNTER — OFFICE VISIT (OUTPATIENT)
Dept: MEDICAL GROUP | Facility: PHYSICIAN GROUP | Age: 41
End: 2021-10-05
Payer: COMMERCIAL

## 2021-10-05 VITALS
RESPIRATION RATE: 16 BRPM | TEMPERATURE: 97.3 F | DIASTOLIC BLOOD PRESSURE: 86 MMHG | HEART RATE: 80 BPM | WEIGHT: 247.6 LBS | BODY MASS INDEX: 38.86 KG/M2 | SYSTOLIC BLOOD PRESSURE: 130 MMHG | HEIGHT: 67 IN | OXYGEN SATURATION: 94 %

## 2021-10-05 DIAGNOSIS — Z82.49 FAMILY HISTORY OF HEART DISEASE: ICD-10-CM

## 2021-10-05 DIAGNOSIS — E11.9 CONTROLLED TYPE 2 DIABETES MELLITUS WITHOUT COMPLICATION, WITHOUT LONG-TERM CURRENT USE OF INSULIN (HCC): ICD-10-CM

## 2021-10-05 DIAGNOSIS — E78.2 MIXED HYPERLIPIDEMIA: ICD-10-CM

## 2021-10-05 DIAGNOSIS — N52.01 ERECTILE DYSFUNCTION DUE TO ARTERIAL INSUFFICIENCY: ICD-10-CM

## 2021-10-05 DIAGNOSIS — E66.9 OBESITY (BMI 35.0-39.9 WITHOUT COMORBIDITY): ICD-10-CM

## 2021-10-05 PROCEDURE — 99214 OFFICE O/P EST MOD 30 MIN: CPT | Performed by: FAMILY MEDICINE

## 2021-10-05 ASSESSMENT — ENCOUNTER SYMPTOMS
BRUISES/BLEEDS EASILY: 0
NEUROLOGICAL NEGATIVE: 1
MYALGIAS: 0
PSYCHIATRIC NEGATIVE: 1
COUGH: 0
DOUBLE VISION: 0
RESPIRATORY NEGATIVE: 1
DEPRESSION: 0
MUSCULOSKELETAL NEGATIVE: 1
HEADACHES: 0
TINGLING: 0
CONSTITUTIONAL NEGATIVE: 1
CHILLS: 0
FEVER: 0
PALPITATIONS: 0
DIZZINESS: 0
BLURRED VISION: 0
CARDIOVASCULAR NEGATIVE: 1
GASTROINTESTINAL NEGATIVE: 1
HEARTBURN: 0
HEMOPTYSIS: 0
NAUSEA: 0
EYES NEGATIVE: 1

## 2021-10-05 NOTE — PROGRESS NOTES
Subjective     Yannick López is a 40 y.o. male who presents with Referral Needed            Patient had father with MI at age 44, his paternal uncle also with heart issues at young age. He would like to get stress test for eval but not having any issues himself at this time  1. Family history of heart disease    - Cardiac Stress Test Treadmill Only    2. Controlled type 2 diabetes mellitus without complication, without long-term current use of insulin (HCC)  Currently treated for DM, taking meds and checking bs at home, trying to do DM diet.controlled    - Cardiac Stress Test Treadmill Only  - NON SPECIFIED; Continuous glucose monitor  Dispense: 1 Each; Refill: 0    3. Obesity (BMI 35.0-39.9 without comorbidity)    - Cardiac Stress Test Treadmill Only    4. Mixed hyperlipidemia  Currently treated for HLD, taking meds with no new myalgias or joint pain, watching fats in diet  controlled    - Cardiac Stress Test Treadmill Only    5. Erectile dysfunction due to arterial insufficiency    - REFERRAL TO UROLOGY    Past Medical History:  2018: Bronchitis  No date: Dental disorder      Comment:  caps  No date: Diabetes (HCC)      Comment:  7/26/21-Avg AM glucose=140, but goes down during the                day.   No date: GERD (gastroesophageal reflux disease)      Comment:  OTC prilosec PRN  No date: Heart burn      Comment:  OTC prilosec prn  No date: High cholesterol  No date: Hyperlipidemia  07/2021: Infectious disease      Comment:  Covid  07/26/2021: Left foot pain      Comment:  healing S/P surgery  07/26/2021: Right wrist pain      Comment:  and hand  No date: Sleep apnea      Comment:  CPAP  No date: Snoring  Past Surgical History:  9/21/2021: PB INCIS TENDON SHEATH,RADIAL STYLOID; Right      Comment:  Procedure: RELEASE, HAND, FOR DEQUERVAIN'S                TENOSYNOVITIS;  Surgeon: Say Davis M.D.;  Location:                SURGERY [facility];  Service: Orthopedics  05/2021: FOOT ORIF; Left  2000:  ACL RECONSTRUCTION; Left  Social History    Tobacco Use      Smoking status: Never Smoker      Smokeless tobacco: Former User        Types: Chew    Vaping Use      Vaping Use: Never used    Alcohol use: Yes      Comment: Maybe 1-2 drinks per week-beer or liquor    Drug use: Never    Review of patient's family history indicates:  Problem: Heart Disease      Relation: Father          Age of Onset: (Not Specified)  Problem: Hyperlipidemia      Relation: Father          Age of Onset: (Not Specified)      Current Outpatient Medications: •  NON SPECIFIED, Continuous glucose monitor, Disp: 1 Each, Rfl: 0•  Multiple Vitamins-Minerals (MULTIVITAMIN ADULTS PO), Take 1 Tablet by mouth every day., Disp: , Rfl: •  KRILL OIL PO, Take 1,200 mg by mouth every day., Disp: , Rfl: •  finasteride (PROPECIA) 1 MG tablet, Take 1 mg by mouth every day., Disp: , Rfl: •  minoxidil (ROGAINE) 2 % external solution, Apply 1 mL topically 2 times a day., Disp: , Rfl: •  ibuprofen (MOTRIN) 200 MG Tab, Take 200 mg by mouth every 6 hours as needed., Disp: , Rfl: •  pioglitazone (ACTOS) 45 MG Tab, Take 1 tablet by mouth every day., Disp: 90 tablet, Rfl: 0•  atorvastatin (LIPITOR) 10 MG Tab, Take 1 tablet by mouth every day., Disp: 90 tablet, Rfl: 0•  metformin (GLUCOPHAGE) 1000 MG tablet, Take 1 tablet by mouth 2 times a day. FOR 90 DAYS, Disp: 180 tablet, Rfl: 0    Patient was instructed on the use of medications, either prescriptions or OTC and informed on when the appropriate follow up time period should be. In addition, patient was also instructed that should any acute worsening occur that they should notify this clinic asap or call 911.          Review of Systems   Constitutional: Negative.  Negative for chills and fever.   HENT: Negative.  Negative for hearing loss.    Eyes: Negative.  Negative for blurred vision and double vision.   Respiratory: Negative.  Negative for cough and hemoptysis.    Cardiovascular: Negative.  Negative for chest  "pain and palpitations.   Gastrointestinal: Negative.  Negative for heartburn and nausea.   Genitourinary: Negative.  Negative for dysuria.   Musculoskeletal: Negative.  Negative for myalgias.   Skin: Negative.  Negative for rash.   Neurological: Negative.  Negative for dizziness, tingling and headaches.   Endo/Heme/Allergies: Negative.  Does not bruise/bleed easily.   Psychiatric/Behavioral: Negative.  Negative for depression and suicidal ideas.   All other systems reviewed and are negative.             Objective     /86 (BP Location: Left arm, Patient Position: Sitting, BP Cuff Size: Adult)   Pulse 80   Temp 36.3 °C (97.3 °F) (Temporal)   Resp 16   Ht 1.702 m (5' 7\")   Wt 112 kg (247 lb 9.6 oz)   SpO2 94%   BMI 38.78 kg/m²      Physical Exam  Vitals and nursing note reviewed.   Constitutional:       General: He is not in acute distress.     Appearance: He is well-developed. He is not diaphoretic.   HENT:      Head: Normocephalic and atraumatic.      Mouth/Throat:      Pharynx: No oropharyngeal exudate.   Eyes:      Pupils: Pupils are equal, round, and reactive to light.   Cardiovascular:      Rate and Rhythm: Normal rate and regular rhythm.      Heart sounds: Normal heart sounds. No murmur heard.   No friction rub. No gallop.    Pulmonary:      Effort: Pulmonary effort is normal. No respiratory distress.      Breath sounds: Normal breath sounds. No wheezing or rales.   Chest:      Chest wall: No tenderness.   Neurological:      Mental Status: He is alert and oriented to person, place, and time.   Psychiatric:         Behavior: Behavior normal.         Thought Content: Thought content normal.         Judgment: Judgment normal.                             Assessment & Plan        1. Family history of heart disease    - Cardiac Stress Test Treadmill Only    2. Controlled type 2 diabetes mellitus without complication, without long-term current use of insulin (HCC)    - Cardiac Stress Test Treadmill Only  - " NON SPECIFIED; Continuous glucose monitor  Dispense: 1 Each; Refill: 0    3. Obesity (BMI 35.0-39.9 without comorbidity)    - Cardiac Stress Test Treadmill Only    4. Mixed hyperlipidemia    - Cardiac Stress Test Treadmill Only    5. Erectile dysfunction due to arterial insufficiency  - REFERRAL TO UROLOGY

## 2021-10-29 DIAGNOSIS — E11.9 DIABETES MELLITUS WITHOUT COMPLICATION (HCC): ICD-10-CM

## 2021-10-29 DIAGNOSIS — E78.2 MIXED HYPERLIPIDEMIA: ICD-10-CM

## 2021-11-01 RX ORDER — ATORVASTATIN CALCIUM 10 MG/1
10 TABLET, FILM COATED ORAL
Qty: 90 TABLET | Refills: 3 | Status: SHIPPED | OUTPATIENT
Start: 2021-11-01 | End: 2022-11-21

## 2021-11-01 RX ORDER — PIOGLITAZONEHYDROCHLORIDE 45 MG/1
45 TABLET ORAL
Qty: 90 TABLET | Refills: 3 | Status: SHIPPED | OUTPATIENT
Start: 2021-11-01

## 2021-11-09 ENCOUNTER — PATIENT MESSAGE (OUTPATIENT)
Dept: MEDICAL GROUP | Facility: PHYSICIAN GROUP | Age: 41
End: 2021-11-09

## 2021-11-09 DIAGNOSIS — E11.9 CONTROLLED TYPE 2 DIABETES MELLITUS WITHOUT COMPLICATION, WITHOUT LONG-TERM CURRENT USE OF INSULIN (HCC): ICD-10-CM

## 2021-11-10 NOTE — TELEPHONE ENCOUNTER
From: Yannick López  To: Physician Murali Levine  Sent: 11/9/2021 12:06 PM PST  Subject: RE: CGM Sensor    DR, Can you please prescribe more of the sensors for the CGM monitor. Ended up only being $40 dollars a month and gives me really good data to manage my diabetes.     Can you please have the prescription picked up at [facility] in [place].     Thank you,

## 2021-11-11 ENCOUNTER — PATIENT MESSAGE (OUTPATIENT)
Dept: MEDICAL GROUP | Facility: PHYSICIAN GROUP | Age: 41
End: 2021-11-11

## 2021-11-11 DIAGNOSIS — E11.9 CONTROLLED TYPE 2 DIABETES MELLITUS WITHOUT COMPLICATION, WITHOUT LONG-TERM CURRENT USE OF INSULIN (HCC): ICD-10-CM

## 2021-11-19 RX ORDER — FLASH GLUCOSE SCANNING READER
EACH MISCELLANEOUS
COMMUNITY
Start: 2021-10-06

## 2021-11-19 RX ORDER — FLASH GLUCOSE SENSOR
KIT MISCELLANEOUS
COMMUNITY
Start: 2021-10-06 | End: 2021-11-19 | Stop reason: SDUPTHER

## 2021-11-22 RX ORDER — FLASH GLUCOSE SENSOR
1 KIT MISCELLANEOUS
Qty: 6 EACH | Refills: 3 | Status: SHIPPED | OUTPATIENT
Start: 2021-11-22 | End: 2023-04-27 | Stop reason: SDUPTHER

## 2022-07-22 ENCOUNTER — TELEPHONE (OUTPATIENT)
Dept: HEALTH INFORMATION MANAGEMENT | Facility: OTHER | Age: 42
End: 2022-07-22

## 2022-07-22 NOTE — TELEPHONE ENCOUNTER
Outcome: Left Message    Please transfer to Patient Outreach Team at [phone] when patient returns call.      Attempt # 1

## 2022-11-17 DIAGNOSIS — E78.2 MIXED HYPERLIPIDEMIA: ICD-10-CM

## 2022-11-17 NOTE — TELEPHONE ENCOUNTER
Received request via: Pharmacy    Was the patient seen in the last year in this department? No    Does the patient have an active prescription (recently filled or refills available) for medication(s) requested? No    Does the patient have [facility] and need 100 day supply (blood pressure, diabetes and cholesterol meds only)? Patient does not have SCP

## 2022-11-21 RX ORDER — ATORVASTATIN CALCIUM 10 MG/1
10 TABLET, FILM COATED ORAL
Qty: 90 TABLET | Refills: 3 | Status: SHIPPED | OUTPATIENT
Start: 2022-11-21 | End: 2023-05-23

## 2022-11-27 DIAGNOSIS — E11.9 DIABETES MELLITUS WITHOUT COMPLICATION (HCC): ICD-10-CM

## 2022-11-28 NOTE — TELEPHONE ENCOUNTER
Received request via: Pharmacy    Was the patient seen in the last year in this department? No    Does the patient have an active prescription (recently filled or refills available) for medication(s) requested? No    Does the patient have nursing home Plus and need 100 day supply (blood pressure, diabetes and cholesterol meds only)? Patient does not have SCP

## 2023-04-25 ENCOUNTER — HOSPITAL ENCOUNTER (OUTPATIENT)
Facility: MEDICAL CENTER | Age: 43
End: 2023-04-25
Attending: FAMILY MEDICINE
Payer: COMMERCIAL

## 2023-04-25 ENCOUNTER — OFFICE VISIT (OUTPATIENT)
Dept: MEDICAL GROUP | Facility: PHYSICIAN GROUP | Age: 43
End: 2023-04-25
Payer: COMMERCIAL

## 2023-04-25 VITALS
DIASTOLIC BLOOD PRESSURE: 88 MMHG | RESPIRATION RATE: 14 BRPM | BODY MASS INDEX: 38.01 KG/M2 | SYSTOLIC BLOOD PRESSURE: 138 MMHG | HEART RATE: 84 BPM | WEIGHT: 242.2 LBS | HEIGHT: 67 IN | OXYGEN SATURATION: 95 % | TEMPERATURE: 98.3 F

## 2023-04-25 DIAGNOSIS — I10 ESSENTIAL HYPERTENSION: ICD-10-CM

## 2023-04-25 DIAGNOSIS — E11.9 DIABETES MELLITUS TYPE II, CONTROLLED (HCC): ICD-10-CM

## 2023-04-25 DIAGNOSIS — E78.2 MIXED HYPERLIPIDEMIA: ICD-10-CM

## 2023-04-25 LAB
HBA1C MFR BLD: 7.5 % (ref ?–5.8)
POCT INT CON NEG: NEGATIVE
POCT INT CON POS: POSITIVE

## 2023-04-25 PROCEDURE — 82043 UR ALBUMIN QUANTITATIVE: CPT

## 2023-04-25 PROCEDURE — 83036 HEMOGLOBIN GLYCOSYLATED A1C: CPT | Performed by: FAMILY MEDICINE

## 2023-04-25 PROCEDURE — 99214 OFFICE O/P EST MOD 30 MIN: CPT | Performed by: FAMILY MEDICINE

## 2023-04-25 PROCEDURE — 82570 ASSAY OF URINE CREATININE: CPT

## 2023-04-25 RX ORDER — TIRZEPATIDE 2.5 MG/.5ML
2.5 INJECTION, SOLUTION SUBCUTANEOUS
Qty: 6 ML | Refills: 3 | Status: SHIPPED | OUTPATIENT
Start: 2023-04-25

## 2023-04-25 RX ORDER — ANASTROZOLE 1 MG/1
1 TABLET ORAL
COMMUNITY
Start: 2022-10-30 | End: 2023-08-18 | Stop reason: SDUPTHER

## 2023-04-25 RX ORDER — GABAPENTIN 100 MG/1
CAPSULE ORAL
COMMUNITY

## 2023-04-25 RX ORDER — METHYLPREDNISOLONE 4 MG/1
TABLET ORAL
COMMUNITY
Start: 2023-03-25

## 2023-04-25 RX ORDER — LISINOPRIL AND HYDROCHLOROTHIAZIDE 12.5; 1 MG/1; MG/1
TABLET ORAL
COMMUNITY

## 2023-04-25 RX ORDER — TADALAFIL 20 MG/1
TABLET ORAL
COMMUNITY
Start: 2022-11-16

## 2023-04-25 RX ORDER — ATORVASTATIN CALCIUM 10 MG/1
1 TABLET, FILM COATED ORAL DAILY
COMMUNITY
Start: 2022-11-21

## 2023-04-25 RX ORDER — SILDENAFIL 100 MG/1
TABLET, FILM COATED ORAL
COMMUNITY

## 2023-04-25 RX ORDER — OFLOXACIN 3 MG/ML
SOLUTION/ DROPS OPHTHALMIC
COMMUNITY
Start: 2023-03-24

## 2023-04-25 RX ORDER — CIPROFLOXACIN HYDROCHLORIDE 3.5 MG/ML
SOLUTION/ DROPS TOPICAL
COMMUNITY
Start: 2023-03-25

## 2023-04-25 ASSESSMENT — ENCOUNTER SYMPTOMS
CONSTITUTIONAL NEGATIVE: 1
GASTROINTESTINAL NEGATIVE: 1
MYALGIAS: 0
DIZZINESS: 0
DOUBLE VISION: 0
PSYCHIATRIC NEGATIVE: 1
CARDIOVASCULAR NEGATIVE: 1
COUGH: 0
CHILLS: 0
RESPIRATORY NEGATIVE: 1
TINGLING: 0
PALPITATIONS: 0
NEUROLOGICAL NEGATIVE: 1
HEADACHES: 0
BLURRED VISION: 0
MUSCULOSKELETAL NEGATIVE: 1
NAUSEA: 0
EYES NEGATIVE: 1
HEMOPTYSIS: 0
BRUISES/BLEEDS EASILY: 0
DEPRESSION: 0
HEARTBURN: 0
FEVER: 0

## 2023-04-25 ASSESSMENT — PATIENT HEALTH QUESTIONNAIRE - PHQ9: CLINICAL INTERPRETATION OF PHQ2 SCORE: 0

## 2023-04-26 LAB
CREAT UR-MCNC: 28.54 MG/DL
MICROALBUMIN UR-MCNC: 5.8 MG/DL
MICROALBUMIN/CREAT UR: 203 MG/G (ref 0–30)

## 2023-04-26 NOTE — PROGRESS NOTES
Subjective     Yannick López is a 42 y.o. male who presents with Med Change Request (Mounjaro)            1. Diabetes mellitus type II, controlled (HCC)  A1c 7.5  Will add mounjaro   POCT A1C   Microalbumin Creat Ratio Urine - Clinic Collect; Future   Comp Metabolic Panel; Future   Lipid Profile; Future   Referral to Ophthalmology   Tirzepatide (MOUNJARO) 2.5 MG/0.5ML Solution Pen-injector; Inject 2.5 mg under the skin every 7 days.  Dispense: 6 mL; Refill: 3    2. Essential hypertension  Currently treated for HTN, taking meds with no CP or sob, monitors bp at home periodically. controlled        3. Mixed hyperlipidemia  Currently treated for HLD, taking meds with no new myalgias or joint pain, watching fats in diet  controlled        Past Medical History:  2018: Bronchitis  No date: Dental disorder      Comment:  caps  No date: Diabetes (HCC)      Comment:  7/26/21-Avg AM glucose=140, but goes down during the                day.   No date: GERD (gastroesophageal reflux disease)      Comment:  OTC prilosec PRN  No date: Heart burn      Comment:  OTC prilosec prn  No date: High cholesterol  No date: Hyperlipidemia  07/2021: Infectious disease      Comment:  Covid  07/26/2021: Left foot pain      Comment:  healing S/P surgery  07/26/2021: Right wrist pain      Comment:  and hand  No date: Sleep apnea      Comment:  CPAP  No date: Snoring  Past Surgical History:  9/21/2021: PB INCIS TENDON SHEATH,RADIAL STYLOID; Right      Comment:  Procedure: RELEASE, HAND, FOR DEQUERVAIN'S                TENOSYNOVITIS;  Surgeon: Say Davis M.D.;  Location:                SURGERY [facility];  Service: Orthopedics  05/2021: ORIF, FOOT; Left  2000: RECONSTRUCTION, KNEE, ACL; Left  Social History    Tobacco Use      Smoking status: Never      Smokeless tobacco: Former        Types: Chew        Quit date: 2016    Vaping Use      Vaping Use: Never used    Alcohol use: Yes      Comment: Maybe 1-2 drinks per week-beer or  liquor    Drug use: Never    Review of patient's family history indicates:      Current Outpatient Medications: ·  atorvastatin (LIPITOR) 10 MG Tab, Take 1 Tablet by mouth every day., Disp: , Rfl: ·  metformin (GLUCOPHAGE) 1000 MG tablet, 1 tab(s), Disp: , Rfl: ·  tadalafil (CIALIS) 20 MG tablet, TAKE 1 TABLET BY MOUTH 30 MINUTES PRIOR TO INTERCOURSE, DO NOT EXCEED 1 TABLET IN 24 HOURS, Disp: , Rfl: ·  Tirzepatide (MOUNJARO) 2.5 MG/0.5ML Solution Pen-injector, Inject 2.5 mg under the skin every 7 days., Disp: 6 mL, Rfl: 3·  metformin (GLUCOPHAGE) 1000 MG tablet, TAKE 1 TABLET BY MOUTH TWICE A DAY, Disp: 180 Tablet, Rfl: 3·  atorvastatin (LIPITOR) 10 MG Tab, TAKE 1 TABLET BY MOUTH EVERY DAY, Disp: 90 Tablet, Rfl: 3·  Continuous Blood Gluc Sensor (FREESTYLE JENNIFER 14 DAY SENSOR) Misc, 1 Each by Other route every 14 days., Disp: 6 Each, Rfl: 3·  Continuous Blood Gluc  (FREESTYLE JENNIFER 14 DAY READER) Device, , Disp: , Rfl: ·  Multiple Vitamins-Minerals (MULTIVITAMIN ADULTS PO), Take 1 Tablet by mouth every day., Disp: , Rfl: ·  KRILL OIL PO, Take 1,200 mg by mouth every day., Disp: , Rfl: ·  ibuprofen (MOTRIN) 200 MG Tab, Take 200 mg by mouth every 6 hours as needed., Disp: , Rfl: ·  anastrozole (ARIMIDEX) 1 MG Tab, Take 1 mg by mouth. (Patient not taking: Reported on 4/25/2023), Disp: , Rfl: ·  ciprofloxacin (CILOXIN) 0.3 % Solution, APPLY 2 DROP EVERY 4 HOURS FOR 7 DAYS (Patient not taking: Reported on 4/25/2023), Disp: , Rfl: ·  gabapentin (NEURONTIN) 100 MG Cap, gabapentin 100 mg capsule (Patient not taking: Reported on 4/25/2023), Disp: , Rfl: ·  lisinopril-hydrochlorothiazide (PRINZIDE) 10-12.5 MG per tablet, 1 tab(s) (Patient not taking: Reported on 4/25/2023), Disp: , Rfl: ·  methylPREDNISolone (MEDROL DOSEPAK) 4 MG Tablet Therapy Pack, TAKE 6 TABLETS ON DAY 1 AS DIRECTED ON PACKAGE AND DECREASE BY 1 TAB EACH DAY FOR A TOTAL OF 6 DAYS (Patient not taking: Reported on 4/25/2023), Disp: , Rfl: ·  ofloxacin  (OCUFLOX) 0.3 % Solution, INSTILL 1 DROP INTO THE AFFECTED EYES EVERY 4 HOURS FOR 7 DAYS (Patient not taking: Reported on 4/25/2023), Disp: , Rfl: ·  sildenafil citrate (VIAGRA) 100 MG tablet, 1 tab(s) (Patient not taking: Reported on 4/25/2023), Disp: , Rfl: ·  NON SPECIFIED, Continuous glucose monitor (Patient not taking: Reported on 4/25/2023), Disp: 1 Each, Rfl: 3·  pioglitazone (ACTOS) 45 MG Tab, Take 1 Tablet by mouth every day. (Patient not taking: Reported on 4/25/2023), Disp: 90 Tablet, Rfl: 3·  finasteride (PROPECIA) 1 MG tablet, Take 1 mg by mouth every day. (Patient not taking: Reported on 4/25/2023), Disp: , Rfl: ·  minoxidil (ROGAINE) 2 % external solution, Apply 1 mL topically 2 times a day. (Patient not taking: Reported on 4/25/2023), Disp: , Rfl:     Patient was instructed on the use of medications, either prescriptions or OTC and informed on when the appropriate follow up time period should be. In addition, patient was also instructed that should any acute worsening occur that they should notify this clinic asap or call 911.            Review of Systems   Constitutional: Negative.  Negative for chills and fever.   HENT: Negative.  Negative for hearing loss.    Eyes: Negative.  Negative for blurred vision and double vision.   Respiratory: Negative.  Negative for cough and hemoptysis.    Cardiovascular: Negative.  Negative for chest pain and palpitations.   Gastrointestinal: Negative.  Negative for heartburn and nausea.   Genitourinary: Negative.  Negative for dysuria.   Musculoskeletal: Negative.  Negative for myalgias.   Skin: Negative.  Negative for rash.   Neurological: Negative.  Negative for dizziness, tingling and headaches.   Endo/Heme/Allergies: Negative.  Does not bruise/bleed easily.   Psychiatric/Behavioral: Negative.  Negative for depression and suicidal ideas.    All other systems reviewed and are negative.           Objective     /88 (BP Location: Left arm, Patient Position:  "Sitting, BP Cuff Size: Adult)   Pulse 84   Temp 36.8 °C (98.3 °F) (Temporal)   Resp 14   Ht 1.702 m (5' 7\")   Wt 110 kg (242 lb 3.2 oz)   SpO2 95%   BMI 37.93 kg/m²      Physical Exam  Vitals and nursing note reviewed.   Constitutional:       General: He is not in acute distress.     Appearance: He is well-developed. He is not diaphoretic.   HENT:      Head: Normocephalic and atraumatic.      Mouth/Throat:      Pharynx: No oropharyngeal exudate.   Eyes:      Pupils: Pupils are equal, round, and reactive to light.   Cardiovascular:      Rate and Rhythm: Normal rate and regular rhythm.      Heart sounds: Normal heart sounds. No murmur heard.    No friction rub. No gallop.   Pulmonary:      Effort: Pulmonary effort is normal. No respiratory distress.      Breath sounds: Normal breath sounds. No wheezing or rales.   Chest:      Chest wall: No tenderness.   Skin:     Comments: Feet clean and dry with good sensation on monofilament exam today     Neurological:      Mental Status: He is alert and oriented to person, place, and time.   Psychiatric:         Behavior: Behavior normal.         Thought Content: Thought content normal.         Judgment: Judgment normal.                           Assessment & Plan        1. Diabetes mellitus type II, controlled (HCC)    - POCT A1C  - Microalbumin Creat Ratio Urine - Clinic Collect; Future  - Comp Metabolic Panel; Future  - Lipid Profile; Future  - Referral to Ophthalmology  - Tirzepatide (MOUNJARO) 2.5 MG/0.5ML Solution Pen-injector; Inject 2.5 mg under the skin every 7 days.  Dispense: 6 mL; Refill: 3    2. Essential hypertension      3. Mixed hyperlipidemia                    "

## 2023-04-27 DIAGNOSIS — E11.9 CONTROLLED TYPE 2 DIABETES MELLITUS WITHOUT COMPLICATION, WITHOUT LONG-TERM CURRENT USE OF INSULIN (HCC): ICD-10-CM

## 2023-04-27 DIAGNOSIS — E11.9 DIABETES MELLITUS WITHOUT COMPLICATION (HCC): ICD-10-CM

## 2023-04-27 RX ORDER — FLASH GLUCOSE SENSOR
1 KIT MISCELLANEOUS
Qty: 6 EACH | Refills: 3 | Status: SHIPPED | OUTPATIENT
Start: 2023-04-27

## 2023-04-27 NOTE — TELEPHONE ENCOUNTER
Received request via: Pharmacy    Was the patient seen in the last year in this department? Yes    Does the patient have an active prescription (recently filled or refills available) for medication(s) requested? No    Does the patient have snf Plus and need 100 day supply (blood pressure, diabetes and cholesterol meds only)? Patient does not have SCP   Initiate Treatment: Otc Lamisil Detail Level: Zone

## 2023-05-11 RX ORDER — TADALAFIL 5 MG/1
5 TABLET ORAL DAILY
Qty: 90 TABLET | Refills: 3 | Status: SHIPPED | OUTPATIENT
Start: 2023-05-11 | End: 2024-05-05

## 2023-05-23 ENCOUNTER — OFFICE VISIT (OUTPATIENT)
Dept: MEDICAL GROUP | Facility: PHYSICIAN GROUP | Age: 43
End: 2023-05-23
Payer: COMMERCIAL

## 2023-05-23 VITALS
TEMPERATURE: 98 F | BODY MASS INDEX: 37.2 KG/M2 | OXYGEN SATURATION: 95 % | HEART RATE: 87 BPM | SYSTOLIC BLOOD PRESSURE: 138 MMHG | HEIGHT: 67 IN | RESPIRATION RATE: 16 BRPM | DIASTOLIC BLOOD PRESSURE: 70 MMHG | WEIGHT: 237 LBS

## 2023-05-23 DIAGNOSIS — E66.9 OBESITY (BMI 35.0-39.9 WITHOUT COMORBIDITY): ICD-10-CM

## 2023-05-23 DIAGNOSIS — E11.9 DIABETES MELLITUS WITHOUT COMPLICATION (HCC): ICD-10-CM

## 2023-05-23 DIAGNOSIS — E78.2 MIXED HYPERLIPIDEMIA: ICD-10-CM

## 2023-05-23 PROCEDURE — 3078F DIAST BP <80 MM HG: CPT | Performed by: FAMILY MEDICINE

## 2023-05-23 PROCEDURE — 99214 OFFICE O/P EST MOD 30 MIN: CPT | Performed by: FAMILY MEDICINE

## 2023-05-23 PROCEDURE — 3075F SYST BP GE 130 - 139MM HG: CPT | Performed by: FAMILY MEDICINE

## 2023-05-23 RX ORDER — FLUTICASONE PROPIONATE 50 MCG
SPRAY, SUSPENSION (ML) NASAL
COMMUNITY

## 2023-05-23 RX ORDER — TADALAFIL 5 MG/1
TABLET ORAL
COMMUNITY

## 2023-05-23 RX ORDER — ATORVASTATIN CALCIUM 10 MG/1
TABLET, FILM COATED ORAL
COMMUNITY

## 2023-05-23 RX ORDER — FLASH GLUCOSE SENSOR
KIT MISCELLANEOUS
COMMUNITY

## 2023-05-23 RX ORDER — GEMFIBROZIL 600 MG/1
600 TABLET, FILM COATED ORAL 2 TIMES DAILY
Qty: 180 TABLET | Refills: 3 | Status: SHIPPED | OUTPATIENT
Start: 2023-05-23

## 2023-05-23 RX ORDER — TADALAFIL 10 MG/1
TABLET ORAL
COMMUNITY

## 2023-05-23 RX ORDER — FLASH GLUCOSE SCANNING READER
EACH MISCELLANEOUS
COMMUNITY

## 2023-05-23 ASSESSMENT — ENCOUNTER SYMPTOMS
DEPRESSION: 0
HEADACHES: 0
TINGLING: 0
CARDIOVASCULAR NEGATIVE: 1
BRUISES/BLEEDS EASILY: 0
PALPITATIONS: 0
MUSCULOSKELETAL NEGATIVE: 1
NEUROLOGICAL NEGATIVE: 1
PSYCHIATRIC NEGATIVE: 1
CHILLS: 0
HEMOPTYSIS: 0
BLURRED VISION: 0
HEARTBURN: 0
CONSTITUTIONAL NEGATIVE: 1
RESPIRATORY NEGATIVE: 1
EYES NEGATIVE: 1
FEVER: 0
DIZZINESS: 0
MYALGIAS: 0
NAUSEA: 0
GASTROINTESTINAL NEGATIVE: 1
DOUBLE VISION: 0
COUGH: 0

## 2023-05-23 NOTE — PROGRESS NOTES
Subjective     Andrea López is a 42 y.o. male who presents with Lab Results            1. Diabetes mellitus without complication (HCC)  Currently treated for DM, taking meds and checking bs at home, trying to do DM diet.controlled       HEMOGLOBIN A1C; Future   Lipid Profile; Future   MICROALBUMIN 24 HR URINE; Future   Basic Metabolic Panel; Future    2. Mixed hyperlipidemia  Trig level of over 500, not on meds now, will add lopid and f/u 3 mo labs   gemfibrozil (LOPID) 600 MG Tab; Take 1 Tablet by mouth 2 times a day.  Dispense: 180 Tablet; Refill: 3   HEMOGLOBIN A1C; Future   Lipid Profile; Future   MICROALBUMIN 24 HR URINE; Future   Basic Metabolic Panel; Future    3. Obesity (BMI 35.0-39.9 without comorbidity)     HEMOGLOBIN A1C; Future   Lipid Profile; Future   MICROALBUMIN 24 HR URINE; Future   Basic Metabolic Panel; Future    Past Medical History:  2018: Bronchitis  No date: Dental disorder      Comment:  caps  No date: Diabetes (HCC)      Comment:  7/26/21-Avg AM glucose=140, but goes down during the                day.   No date: GERD (gastroesophageal reflux disease)      Comment:  OTC prilosec PRN  No date: Heart burn      Comment:  OTC prilosec prn  No date: High cholesterol  No date: Hyperlipidemia  07/2021: Infectious disease      Comment:  Covid  07/26/2021: Left foot pain      Comment:  healing S/P surgery  07/26/2021: Right wrist pain      Comment:  and hand  No date: Sleep apnea      Comment:  CPAP  No date: Snoring  Past Surgical History:  9/21/2021: PB INCIS TENDON SHEATH,RADIAL STYLOID; Right      Comment:  Procedure: RELEASE, HAND, FOR DEQUERVAIN'S                TENOSYNOVITIS;  Surgeon: Say Davis M.D.;  Location:                SURGERY [facility];  Service: Orthopedics  05/2021: ORIF, FOOT; Left  2000: RECONSTRUCTION, KNEE, ACL; Left  Social History    Tobacco Use      Smoking status: Never      Smokeless tobacco: Former        Types: Chew        Quit date: 2016    Vaping Use       Vaping Use: Never used    Alcohol use: Yes      Comment: Maybe 1-2 drinks per week-beer or liquor    Drug use: Never    Review of patient's family history indicates:  Problem: Heart Disease      Relation: Father          Age of Onset: (Not Specified)  Problem: Hyperlipidemia      Relation: Father          Age of Onset: (Not Specified)      Current Outpatient Medications: ·  Continuous Blood Gluc  (FREESTYLE JENNIFER 14 DAY READER) Device, FreeStyle Jennifer 14 Day [place], Disp: , Rfl: ·  gemfibrozil (LOPID) 600 MG Tab, Take 1 Tablet by mouth 2 times a day., Disp: 180 Tablet, Rfl: 3·  tadalafil (CIALIS) 5 MG tablet, Take 1 Tablet by mouth every day for 360 days., Disp: 90 Tablet, Rfl: 3·  metformin (GLUCOPHAGE) 1000 MG tablet, Take 1 Tablet by mouth 2 times a day., Disp: 180 Tablet, Rfl: 3·  Continuous Blood Gluc Sensor (FREESTYLE JENNIFER 14 DAY SENSOR) Misc, 1 Each by Other route every 14 days., Disp: 6 Each, Rfl: 3·  Tirzepatide (MOUNJARO) 2.5 MG/0.5ML Solution Pen-injector, Inject 2.5 mg under the skin every 7 days., Disp: 6 mL, Rfl: 3·  Multiple Vitamins-Minerals (MULTIVITAMIN ADULTS PO), Take 1 Tablet by mouth every day., Disp: , Rfl: ·  KRILL OIL PO, Take 1,200 mg by mouth every day., Disp: , Rfl: ·  atorvastatin (LIPITOR) 10 MG Tab, 1 tab(s) (Patient not taking: Reported on 5/23/2023), Disp: , Rfl: ·  tadalafil (CIALIS) 10 MG tablet, tadalafil 10 mg tablet (Patient not taking: Reported on 5/23/2023), Disp: , Rfl: ·  tadalafil (CIALIS) 5 MG tablet, tadalafil 5 mg tablet  TAKE ONE TABLET BY MOUTH ONE TIME DAILY (Patient not taking: Reported on 5/23/2023), Disp: , Rfl: ·  OMEPRAZOLE PO, 1 cap(s) (Patient not taking: Reported on 5/23/2023), Disp: , Rfl: ·  LISINOPRIL PO, , Disp: , Rfl: ·  Continuous Blood Gluc Sensor (FREESTYLE JENNIFER 14 DAY SENSOR) Misc, FreeStyle Jennifer 14 Day Sensor kit (Patient not taking: Reported on 5/23/2023), Disp: , Rfl: ·  fluticasone (FLONASE) 50 MCG/ACT nasal spray, 1 spray(s) (Patient  not taking: Reported on 5/23/2023), Disp: , Rfl: ·  anastrozole (ARIMIDEX) 1 MG Tab, Take 1 mg by mouth. (Patient not taking: Reported on 4/25/2023), Disp: , Rfl: ·  atorvastatin (LIPITOR) 10 MG Tab, Take 1 Tablet by mouth every day. (Patient not taking: Reported on 5/23/2023), Disp: , Rfl: ·  ciprofloxacin (CILOXIN) 0.3 % Solution, APPLY 2 DROP EVERY 4 HOURS FOR 7 DAYS (Patient not taking: Reported on 4/25/2023), Disp: , Rfl: ·  gabapentin (NEURONTIN) 100 MG Cap, gabapentin 100 mg capsule (Patient not taking: Reported on 4/25/2023), Disp: , Rfl: ·  lisinopril-hydrochlorothiazide (PRINZIDE) 10-12.5 MG per tablet, 1 tab(s) (Patient not taking: Reported on 4/25/2023), Disp: , Rfl: ·  metformin (GLUCOPHAGE) 1000 MG tablet, 1 tab(s) (Patient not taking: Reported on 5/23/2023), Disp: , Rfl: ·  methylPREDNISolone (MEDROL DOSEPAK) 4 MG Tablet Therapy Pack, TAKE 6 TABLETS ON DAY 1 AS DIRECTED ON PACKAGE AND DECREASE BY 1 TAB EACH DAY FOR A TOTAL OF 6 DAYS (Patient not taking: Reported on 4/25/2023), Disp: , Rfl: ·  ofloxacin (OCUFLOX) 0.3 % Solution, INSTILL 1 DROP INTO THE AFFECTED EYES EVERY 4 HOURS FOR 7 DAYS (Patient not taking: Reported on 4/25/2023), Disp: , Rfl: ·  sildenafil citrate (VIAGRA) 100 MG tablet, 1 tab(s) (Patient not taking: Reported on 4/25/2023), Disp: , Rfl: ·  tadalafil (CIALIS) 20 MG tablet, TAKE 1 TABLET BY MOUTH 30 MINUTES PRIOR TO INTERCOURSE, DO NOT EXCEED 1 TABLET IN 24 HOURS (Patient not taking: Reported on 5/23/2023), Disp: , Rfl: ·  Continuous Blood Gluc  (FREESTYLE JENNIFER 14 DAY READER) Device, , Disp: , Rfl: ·  NON SPECIFIED, Continuous glucose monitor (Patient not taking: Reported on 4/25/2023), Disp: 1 Each, Rfl: 3·  pioglitazone (ACTOS) 45 MG Tab, Take 1 Tablet by mouth every day. (Patient not taking: Reported on 4/25/2023), Disp: 90 Tablet, Rfl: 3·  finasteride (PROPECIA) 1 MG tablet, Take 1 mg by mouth every day. (Patient not taking: Reported on 4/25/2023), Disp: , Rfl: ·   "minoxidil (ROGAINE) 2 % external solution, Apply 1 mL topically 2 times a day. (Patient not taking: Reported on 4/25/2023), Disp: , Rfl: ·  ibuprofen (MOTRIN) 200 MG Tab, Take 200 mg by mouth every 6 hours as needed. (Patient not taking: Reported on 5/23/2023), Disp: , Rfl:     Patient was instructed on the use of medications, either prescriptions or OTC and informed on when the appropriate follow up time period should be. In addition, patient was also instructed that should any acute worsening occur that they should notify this clinic asap or call 911.              Review of Systems   Constitutional: Negative.  Negative for chills and fever.   HENT: Negative.  Negative for hearing loss.    Eyes: Negative.  Negative for blurred vision and double vision.   Respiratory: Negative.  Negative for cough and hemoptysis.    Cardiovascular: Negative.  Negative for chest pain and palpitations.   Gastrointestinal: Negative.  Negative for heartburn and nausea.   Genitourinary: Negative.  Negative for dysuria.   Musculoskeletal: Negative.  Negative for myalgias.   Skin: Negative.  Negative for rash.   Neurological: Negative.  Negative for dizziness, tingling and headaches.   Endo/Heme/Allergies: Negative.  Does not bruise/bleed easily.   Psychiatric/Behavioral: Negative.  Negative for depression and suicidal ideas.    All other systems reviewed and are negative.             Objective     /70 (BP Location: Right arm, Patient Position: Sitting, BP Cuff Size: Adult)   Pulse 87   Temp 36.7 °C (98 °F) (Temporal)   Resp 16   Ht 1.702 m (5' 7\")   Wt 108 kg (237 lb)   SpO2 95%   BMI 37.12 kg/m²      Physical Exam  Vitals and nursing note reviewed.   Constitutional:       General: He is not in acute distress.     Appearance: He is well-developed. He is not diaphoretic.   HENT:      Head: Normocephalic and atraumatic.      Mouth/Throat:      Pharynx: No oropharyngeal exudate.   Eyes:      Pupils: Pupils are equal, round, and " reactive to light.   Cardiovascular:      Rate and Rhythm: Normal rate and regular rhythm.      Heart sounds: Normal heart sounds. No murmur heard.     No friction rub. No gallop.   Pulmonary:      Effort: Pulmonary effort is normal. No respiratory distress.      Breath sounds: Normal breath sounds. No wheezing or rales.   Chest:      Chest wall: No tenderness.   Neurological:      Mental Status: He is alert and oriented to person, place, and time.   Psychiatric:         Behavior: Behavior normal.         Thought Content: Thought content normal.         Judgment: Judgment normal.                             Assessment & Plan        1. Diabetes mellitus without complication (HCC)    - HEMOGLOBIN A1C; Future  - Lipid Profile; Future  - MICROALBUMIN 24 HR URINE; Future  - Basic Metabolic Panel; Future    2. Mixed hyperlipidemia    - gemfibrozil (LOPID) 600 MG Tab; Take 1 Tablet by mouth 2 times a day.  Dispense: 180 Tablet; Refill: 3  - HEMOGLOBIN A1C; Future  - Lipid Profile; Future  - MICROALBUMIN 24 HR URINE; Future  - Basic Metabolic Panel; Future    3. Obesity (BMI 35.0-39.9 without comorbidity)    - HEMOGLOBIN A1C; Future  - Lipid Profile; Future  - MICROALBUMIN 24 HR URINE; Future  - Basic Metabolic Panel; Future

## 2023-06-11 ENCOUNTER — PATIENT MESSAGE (OUTPATIENT)
Dept: MEDICAL GROUP | Facility: PHYSICIAN GROUP | Age: 43
End: 2023-06-11
Payer: COMMERCIAL

## 2023-06-11 DIAGNOSIS — E11.9 CONTROLLED TYPE 2 DIABETES MELLITUS WITHOUT COMPLICATION, WITHOUT LONG-TERM CURRENT USE OF INSULIN (HCC): ICD-10-CM

## 2023-08-02 NOTE — TELEPHONE ENCOUNTER
Received request via: Patient    Was the patient seen in the last year in this department? Yes    Does the patient have an active prescription (recently filled or refills available) for medication(s) requested? No    Does the patient have [facility] Plus and need 100 day supply (blood pressure, diabetes and cholesterol meds only)? Patient does not have SCP    Can you send mounjaro 7.5 since they are out of stock of the 10's. Thank you

## 2023-08-07 ENCOUNTER — PATIENT MESSAGE (OUTPATIENT)
Dept: MEDICAL GROUP | Facility: PHYSICIAN GROUP | Age: 43
End: 2023-08-07
Payer: COMMERCIAL

## 2023-08-07 DIAGNOSIS — E11.9 CONTROLLED TYPE 2 DIABETES MELLITUS WITHOUT COMPLICATION, WITHOUT LONG-TERM CURRENT USE OF INSULIN (HCC): ICD-10-CM

## 2023-08-07 NOTE — PATIENT COMMUNICATION
Pt would like this to go to [facility] in [place]. They currently have the medication in stock.

## 2023-08-20 RX ORDER — ANASTROZOLE 1 MG/1
1 TABLET ORAL DAILY
Qty: 30 TABLET | Refills: 3 | Status: SHIPPED | OUTPATIENT
Start: 2023-08-20

## 2023-09-20 LAB
HBA1C MFR BLD: 6 % (ref ?–5.8)

## 2023-10-02 ENCOUNTER — OFFICE VISIT (OUTPATIENT)
Dept: MEDICAL GROUP | Facility: PHYSICIAN GROUP | Age: 43
End: 2023-10-02
Payer: COMMERCIAL

## 2023-10-02 VITALS
TEMPERATURE: 98 F | DIASTOLIC BLOOD PRESSURE: 80 MMHG | SYSTOLIC BLOOD PRESSURE: 136 MMHG | WEIGHT: 226 LBS | OXYGEN SATURATION: 94 % | HEART RATE: 99 BPM | BODY MASS INDEX: 35.47 KG/M2 | RESPIRATION RATE: 14 BRPM | HEIGHT: 67 IN

## 2023-10-02 DIAGNOSIS — E11.9 CONTROLLED TYPE 2 DIABETES MELLITUS WITHOUT COMPLICATION, WITHOUT LONG-TERM CURRENT USE OF INSULIN (HCC): ICD-10-CM

## 2023-10-02 DIAGNOSIS — E78.2 MIXED HYPERLIPIDEMIA: ICD-10-CM

## 2023-10-02 DIAGNOSIS — I10 ESSENTIAL HYPERTENSION: ICD-10-CM

## 2023-10-02 PROCEDURE — 3075F SYST BP GE 130 - 139MM HG: CPT | Performed by: FAMILY MEDICINE

## 2023-10-02 PROCEDURE — 99214 OFFICE O/P EST MOD 30 MIN: CPT | Performed by: FAMILY MEDICINE

## 2023-10-02 PROCEDURE — 3079F DIAST BP 80-89 MM HG: CPT | Performed by: FAMILY MEDICINE

## 2023-10-02 RX ORDER — ATORVASTATIN CALCIUM 10 MG/1
1 TABLET, FILM COATED ORAL
COMMUNITY

## 2023-10-02 RX ORDER — SILDENAFIL 100 MG/1
1 TABLET, FILM COATED ORAL
COMMUNITY

## 2023-10-02 ASSESSMENT — ENCOUNTER SYMPTOMS
HEARTBURN: 0
CARDIOVASCULAR NEGATIVE: 1
RESPIRATORY NEGATIVE: 1
TINGLING: 0
DIZZINESS: 0
GASTROINTESTINAL NEGATIVE: 1
MUSCULOSKELETAL NEGATIVE: 1
HEMOPTYSIS: 0
DEPRESSION: 0
HEADACHES: 0
CHILLS: 0
PALPITATIONS: 0
DOUBLE VISION: 0
NAUSEA: 0
FEVER: 0
PSYCHIATRIC NEGATIVE: 1
MYALGIAS: 0
CONSTITUTIONAL NEGATIVE: 1
BLURRED VISION: 0
NEUROLOGICAL NEGATIVE: 1
BRUISES/BLEEDS EASILY: 0
COUGH: 0
EYES NEGATIVE: 1

## 2023-10-02 NOTE — PROGRESS NOTES
Subjective     Andrea López is a 42 y.o. male who presents with Lab Results            1. Controlled type 2 diabetes mellitus without complication, without long-term current use of insulin (HCC)  A1c down to 6  Currently treated for DM, taking meds and checking bs at home, trying to do DM diet.controlled     Referral to Ophthalmology    2. Mixed hyperlipidemia  Not taking lopid or statin   Will start and recheck in 3 mo    3. Essential hypertension  Currently treated for HTN, taking meds with no CP or sob, monitors bp at home periodically. controlled        Past Medical History:  2018: Bronchitis  No date: Dental disorder      Comment:  caps  No date: Diabetes (HCC)      Comment:  7/26/21-Avg AM glucose=140, but goes down during the                day.   No date: GERD (gastroesophageal reflux disease)      Comment:  OTC prilosec PRN  No date: Heart burn      Comment:  OTC prilosec prn  No date: High cholesterol  No date: Hyperlipidemia  07/2021: Infectious disease      Comment:  Covid  07/26/2021: Left foot pain      Comment:  healing S/P surgery  07/26/2021: Right wrist pain      Comment:  and hand  No date: Sleep apnea      Comment:  CPAP  No date: Snoring  Past Surgical History:  9/21/2021: PB INCIS TENDON SHEATH,RADIAL STYLOID; Right      Comment:  Procedure: RELEASE, HAND, FOR DEQUERVAIN'S                TENOSYNOVITIS;  Surgeon: Say Davis M.D.;  Location:                SURGERY [facility];  Service: Orthopedics  05/2021: ORIF, FOOT; Left  2000: RECONSTRUCTION, KNEE, ACL; Left  Social History    Tobacco Use      Smoking status: Never      Smokeless tobacco: Former        Types: Chew        Quit date: 2016    Vaping Use      Vaping Use: Never used    Alcohol use: Yes      Comment: Maybe 1-2 drinks per week-beer or liquor    Drug use: Never    Review of patient's family history indicates:  Problem: Heart Disease      Relation: Father          Age of Onset: (Not Specified)  Problem: Hyperlipidemia       Relation: Father          Age of Onset: (Not Specified)      Current Outpatient Medications: ·  anastrozole (ARIMIDEX) 1 MG Tab, Take 1 Tablet by mouth every day., Disp: 30 Tablet, Rfl: 3·  Tirzepatide 7.5 MG/0.5ML Solution Pen-injector, Inject 7.5 mg under the skin every 7 days., Disp: 7.5 mL, Rfl: 3·  gemfibrozil (LOPID) 600 MG Tab, Take 1 Tablet by mouth 2 times a day., Disp: 180 Tablet, Rfl: 3·  tadalafil (CIALIS) 5 MG tablet, Take 1 Tablet by mouth every day for 360 days., Disp: 90 Tablet, Rfl: 3·  Continuous Blood Gluc Sensor (FREESTYLE JENNIFER 14 DAY SENSOR) Misc, 1 Each by Other route every 14 days., Disp: 6 Each, Rfl: 3·  metformin (GLUCOPHAGE) 1000 MG tablet, , Disp: , Rfl: ·  Continuous Blood Gluc  (FREESTYLE JENNIFER 14 DAY READER) Device, , Disp: , Rfl: ·  Multiple Vitamins-Minerals (MULTIVITAMIN ADULTS PO), Take 1 Tablet by mouth every day., Disp: , Rfl: ·  KRILL OIL PO, Take 1,200 mg by mouth every day., Disp: , Rfl: ·  ibuprofen (MOTRIN) 200 MG Tab, Take 200 mg by mouth every 6 hours as needed., Disp: , Rfl: ·  atorvastatin (LIPITOR) 10 MG Tab, Take 1 Tablet by mouth every day. (Patient not taking: Reported on 10/2/2023), Disp: , Rfl: ·  metformin (GLUCOPHAGE) 1000 MG tablet, Take 1 Tablet by mouth 2 times a day. (Patient not taking: Reported on 10/2/2023), Disp: , Rfl: ·  sildenafil citrate (VIAGRA) 100 MG tablet, Take 1 Tablet by mouth 1 time a day as needed. (Patient not taking: Reported on 10/2/2023), Disp: , Rfl: ·  Tirzepatide 10 MG/0.5ML Solution Pen-injector, Inject 10 mg under the skin every 7 days. (Patient not taking: Reported on 10/2/2023), Disp: 0.5 mL, Rfl: 6·  Tirzepatide 5 MG/0.5ML Solution Pen-injector, Inject 5 mg under the skin every 7 days. (Patient not taking: Reported on 10/2/2023), Disp: 3 mL, Rfl: 1·  atorvastatin (LIPITOR) 10 MG Tab, 1 tab(s) (Patient not taking: Reported on 5/23/2023), Disp: , Rfl: ·  tadalafil (CIALIS) 10 MG tablet, tadalafil 10 mg tablet (Patient not  taking: Reported on 10/2/2023), Disp: , Rfl: ·  tadalafil (CIALIS) 5 MG tablet, tadalafil 5 mg tablet  TAKE ONE TABLET BY MOUTH ONE TIME DAILY (Patient not taking: Reported on 5/23/2023), Disp: , Rfl: ·  OMEPRAZOLE PO, 1 cap(s) (Patient not taking: Reported on 5/23/2023), Disp: , Rfl: ·  LISINOPRIL PO, , Disp: , Rfl: ·  Continuous Blood Gluc Sensor (FREESTYLE JENNIFER 14 DAY SENSOR) Misc, FreeStyle Jennifer 14 Day Sensor kit (Patient not taking: Reported on 5/23/2023), Disp: , Rfl: ·  Continuous Blood Gluc  (FREESTYLE JENNIFER 14 DAY READER) Device, FreeStyle Jennifer 14 Day [place] (Patient not taking: Reported on 10/2/2023), Disp: , Rfl: ·  fluticasone (FLONASE) 50 MCG/ACT nasal spray, 1 spray(s) (Patient not taking: Reported on 5/23/2023), Disp: , Rfl: ·  metformin (GLUCOPHAGE) 1000 MG tablet, Take 1 Tablet by mouth 2 times a day. (Patient not taking: Reported on 10/2/2023), Disp: 180 Tablet, Rfl: 3·  atorvastatin (LIPITOR) 10 MG Tab, Take 1 Tablet by mouth every day. (Patient not taking: Reported on 5/23/2023), Disp: , Rfl: ·  ciprofloxacin (CILOXIN) 0.3 % Solution, APPLY 2 DROP EVERY 4 HOURS FOR 7 DAYS (Patient not taking: Reported on 4/25/2023), Disp: , Rfl: ·  gabapentin (NEURONTIN) 100 MG Cap, gabapentin 100 mg capsule (Patient not taking: Reported on 4/25/2023), Disp: , Rfl: ·  lisinopril-hydrochlorothiazide (PRINZIDE) 10-12.5 MG per tablet, 1 tab(s) (Patient not taking: Reported on 4/25/2023), Disp: , Rfl: ·  methylPREDNISolone (MEDROL DOSEPAK) 4 MG Tablet Therapy Pack, TAKE 6 TABLETS ON DAY 1 AS DIRECTED ON PACKAGE AND DECREASE BY 1 TAB EACH DAY FOR A TOTAL OF 6 DAYS (Patient not taking: Reported on 4/25/2023), Disp: , Rfl: ·  ofloxacin (OCUFLOX) 0.3 % Solution, INSTILL 1 DROP INTO THE AFFECTED EYES EVERY 4 HOURS FOR 7 DAYS (Patient not taking: Reported on 4/25/2023), Disp: , Rfl: ·  sildenafil citrate (VIAGRA) 100 MG tablet, 1 tab(s) (Patient not taking: Reported on 4/25/2023), Disp: , Rfl: ·  tadalafil  (CIALIS) 20 MG tablet, TAKE 1 TABLET BY MOUTH 30 MINUTES PRIOR TO INTERCOURSE, DO NOT EXCEED 1 TABLET IN 24 HOURS (Patient not taking: Reported on 5/23/2023), Disp: , Rfl: ·  Tirzepatide (MOUNJARO) 2.5 MG/0.5ML Solution Pen-injector, Inject 2.5 mg under the skin every 7 days. (Patient not taking: Reported on 10/2/2023), Disp: 6 mL, Rfl: 3·  NON SPECIFIED, Continuous glucose monitor (Patient not taking: Reported on 4/25/2023), Disp: 1 Each, Rfl: 3·  pioglitazone (ACTOS) 45 MG Tab, Take 1 Tablet by mouth every day. (Patient not taking: Reported on 4/25/2023), Disp: 90 Tablet, Rfl: 3·  finasteride (PROPECIA) 1 MG tablet, Take 1 mg by mouth every day. (Patient not taking: Reported on 4/25/2023), Disp: , Rfl: ·  minoxidil (ROGAINE) 2 % external solution, Apply 1 mL topically 2 times a day. (Patient not taking: Reported on 4/25/2023), Disp: , Rfl:     Patient was instructed on the use of medications, either prescriptions or OTC and informed on when the appropriate follow up time period should be. In addition, patient was also instructed that should any acute worsening occur that they should notify this clinic asap or call 911.              Review of Systems   Constitutional: Negative.  Negative for chills and fever.   HENT: Negative.  Negative for hearing loss.    Eyes: Negative.  Negative for blurred vision and double vision.   Respiratory: Negative.  Negative for cough and hemoptysis.    Cardiovascular: Negative.  Negative for chest pain and palpitations.   Gastrointestinal: Negative.  Negative for heartburn and nausea.   Genitourinary: Negative.  Negative for dysuria.   Musculoskeletal: Negative.  Negative for myalgias.   Skin: Negative.  Negative for rash.   Neurological: Negative.  Negative for dizziness, tingling and headaches.   Endo/Heme/Allergies: Negative.  Does not bruise/bleed easily.   Psychiatric/Behavioral: Negative.  Negative for depression and suicidal ideas.    All other systems reviewed and are  "negative.             Objective     /80   Pulse 99   Temp 36.7 °C (98 °F) (Temporal)   Resp 14   Ht 1.702 m (5' 7\")   Wt 103 kg (226 lb)   SpO2 94%   BMI 35.40 kg/m²      Physical Exam  Vitals and nursing note reviewed.   Constitutional:       General: He is not in acute distress.     Appearance: He is well-developed. He is not diaphoretic.   HENT:      Head: Normocephalic and atraumatic.      Mouth/Throat:      Pharynx: No oropharyngeal exudate.   Eyes:      Pupils: Pupils are equal, round, and reactive to light.   Cardiovascular:      Rate and Rhythm: Normal rate and regular rhythm.      Heart sounds: Normal heart sounds. No murmur heard.     No friction rub. No gallop.   Pulmonary:      Effort: Pulmonary effort is normal. No respiratory distress.      Breath sounds: Normal breath sounds. No wheezing or rales.   Chest:      Chest wall: No tenderness.   Neurological:      Mental Status: He is alert and oriented to person, place, and time.   Psychiatric:         Behavior: Behavior normal.         Thought Content: Thought content normal.         Judgment: Judgment normal.                             Assessment & Plan        1. Controlled type 2 diabetes mellitus without complication, without long-term current use of insulin ([facility])    - Referral to Ophthalmology    2. Mixed hyperlipidemia      3. Essential hypertension                    "

## 2023-10-23 DIAGNOSIS — E11.9 CONTROLLED TYPE 2 DIABETES MELLITUS WITHOUT COMPLICATION, WITHOUT LONG-TERM CURRENT USE OF INSULIN (HCC): ICD-10-CM

## 2023-10-23 NOTE — TELEPHONE ENCOUNTER
Received request via: Pharmacy    Was the patient seen in the last year in this department? Yes    Does the patient have an active prescription (recently filled or refills available) for medication(s) requested? No    Does the patient have [facility] Plus and need 100 day supply (blood pressure, diabetes and cholesterol meds only)? Patient does not have SCP    Last office visit 10/2/23  Last labs 09/20/23

## 2023-12-12 DIAGNOSIS — E11.9 CONTROLLED TYPE 2 DIABETES MELLITUS WITHOUT COMPLICATION, WITHOUT LONG-TERM CURRENT USE OF INSULIN (HCC): ICD-10-CM

## 2024-01-02 LAB — HBA1C MFR BLD: 6.1 % (ref ?–5.8)

## 2024-03-29 DIAGNOSIS — E11.9 CONTROLLED TYPE 2 DIABETES MELLITUS WITHOUT COMPLICATION, WITHOUT LONG-TERM CURRENT USE OF INSULIN (HCC): ICD-10-CM

## 2024-03-29 NOTE — TELEPHONE ENCOUNTER
Received request via: Pharmacy    Was the patient seen in the last year in this department? Yes    Does the patient have an active prescription (recently filled or refills available) for medication(s) requested? No    Pharmacy Name: Pharmacy:   [facility] - [address]     Does the patient have longterm Plus and need 100 day supply (blood pressure, diabetes and cholesterol meds only)? Patient does not have SCP

## 2024-04-01 NOTE — TELEPHONE ENCOUNTER
Received request via: Patient    Was the patient seen in the last year in this department? Yes    Does the patient have an active prescription (recently filled or refills available) for medication(s) requested? No    [facility]    Does the patient have [facility] Plus and need 100 day supply (blood pressure, diabetes and cholesterol meds only)? Patient does not have SCP

## 2024-04-04 ENCOUNTER — PATIENT MESSAGE (OUTPATIENT)
Dept: HEALTH INFORMATION MANAGEMENT | Facility: OTHER | Age: 44
End: 2024-04-04

## 2024-04-04 DIAGNOSIS — Z13.5 SCREENING FOR DIABETIC RETINOPATHY: ICD-10-CM

## 2024-04-08 DIAGNOSIS — E11.9 CONTROLLED TYPE 2 DIABETES MELLITUS WITHOUT COMPLICATION, WITHOUT LONG-TERM CURRENT USE OF INSULIN (HCC): ICD-10-CM

## 2024-04-08 NOTE — TELEPHONE ENCOUNTER
Received request via: Patient    Was the patient seen in the last year in this department? Yes    Does the patient have an active prescription (recently filled or refills available) for medication(s) requested? No    Pharmacy Name: [facility]    Does the patient have [facility] and need 100 day supply (blood pressure, diabetes and cholesterol meds only)? Patient does not have SCP

## 2024-04-10 NOTE — TELEPHONE ENCOUNTER
Pharmacy comment: PLEASE PROVIDE SPECIFIC BRAND AND DIAGNOSIS FOR TIRZEPATIDE. THANKS.

## 2024-04-11 ENCOUNTER — PATIENT MESSAGE (OUTPATIENT)
Dept: MEDICAL GROUP | Facility: PHYSICIAN GROUP | Age: 44
End: 2024-04-11
Payer: COMMERCIAL

## 2024-04-11 DIAGNOSIS — E11.9 CONTROLLED TYPE 2 DIABETES MELLITUS WITHOUT COMPLICATION, WITHOUT LONG-TERM CURRENT USE OF INSULIN (HCC): ICD-10-CM

## 2024-04-11 RX ORDER — TIRZEPATIDE 15 MG/.5ML
INJECTION, SOLUTION SUBCUTANEOUS
COMMUNITY
End: 2024-04-11 | Stop reason: SDUPTHER

## 2024-04-11 RX ORDER — TIRZEPATIDE 12.5 MG/.5ML
INJECTION, SOLUTION SUBCUTANEOUS
Qty: 12 ML | Refills: 3 | Status: SHIPPED | OUTPATIENT
Start: 2024-04-11

## 2024-04-11 NOTE — PATIENT COMMUNICATION
Received request via: Patient    Was the patient seen in the last year in this department? Yes    Does the patient have an active prescription (recently filled or refills available) for medication(s) requested? No    Pharmacy Name: [facility]    Does the patient have care home Plus and need 100 day supply (blood pressure, diabetes and cholesterol meds only)? Patient does not have SCP

## 2024-04-15 RX ORDER — TIRZEPATIDE 15 MG/.5ML
15 INJECTION, SOLUTION SUBCUTANEOUS
Qty: 2 ML | Refills: 3 | Status: SHIPPED | OUTPATIENT
Start: 2024-04-15

## 2024-05-06 LAB — HBA1C MFR BLD: 6.2 % (ref ?–5.8)

## 2024-05-13 ENCOUNTER — PATIENT MESSAGE (OUTPATIENT)
Dept: MEDICAL GROUP | Facility: PHYSICIAN GROUP | Age: 44
End: 2024-05-13
Payer: COMMERCIAL

## 2024-05-13 DIAGNOSIS — E78.2 MIXED HYPERLIPIDEMIA: ICD-10-CM

## 2024-05-14 RX ORDER — GEMFIBROZIL 600 MG/1
600 TABLET, FILM COATED ORAL 2 TIMES DAILY
Qty: 180 TABLET | Refills: 3 | Status: SHIPPED | OUTPATIENT
Start: 2024-05-14

## 2024-05-14 RX ORDER — ANASTROZOLE 1 MG/1
1 TABLET ORAL DAILY
Qty: 30 TABLET | Refills: 3 | Status: SHIPPED | OUTPATIENT
Start: 2024-05-14

## 2024-05-14 NOTE — PATIENT COMMUNICATION
Pt requesting medication to be sent to [facility] instead     Received request via: Patient    Was the patient seen in the last year in this department? Yes    Does the patient have an active prescription (recently filled or refills available) for medication(s) requested? No    Pharmacy Name: [facility] pharmacy     Does the patient have [facility] Plus and need 100 day supply (blood pressure, diabetes and cholesterol meds only)? Patient does not have SCP

## 2024-05-16 DIAGNOSIS — E11.9 CONTROLLED TYPE 2 DIABETES MELLITUS WITHOUT COMPLICATION, WITHOUT LONG-TERM CURRENT USE OF INSULIN (HCC): ICD-10-CM

## 2024-05-16 RX ORDER — TIRZEPATIDE 15 MG/.5ML
15 INJECTION, SOLUTION SUBCUTANEOUS
Qty: 2 ML | Refills: 3 | Status: SHIPPED | OUTPATIENT
Start: 2024-05-16

## 2024-05-16 NOTE — TELEPHONE ENCOUNTER
Received request via: Patient    Was the patient seen in the last year in this department? Yes    Does the patient have an active prescription (recently filled or refills available) for medication(s) requested? No    Pharmacy Name: Pharmacy:   [facility] - [address]     Does the patient have [facility] Plus and need 100 day supply (blood pressure, diabetes and cholesterol meds only)? Patient does not have SCP

## 2024-05-31 DIAGNOSIS — E11.9 DIABETES MELLITUS TYPE II, CONTROLLED (HCC): ICD-10-CM

## 2024-05-31 DIAGNOSIS — E11.9 CONTROLLED TYPE 2 DIABETES MELLITUS WITHOUT COMPLICATION, WITHOUT LONG-TERM CURRENT USE OF INSULIN (HCC): ICD-10-CM

## 2024-05-31 NOTE — TELEPHONE ENCOUNTER
Pt requesting medication to be sent to [facility] pharmacy       Received request via: Pharmacy    Was the patient seen in the last year in this department? Yes    Does the patient have an active prescription (recently filled or refills available) for medication(s) requested? No    Pharmacy Name: [facility] pharmacy     Does the patient have nursing home Plus and need 100 day supply (blood pressure, diabetes and cholesterol meds only)? Patient does not have SCP

## 2024-06-03 DIAGNOSIS — E11.9 DIABETES MELLITUS TYPE II, CONTROLLED (HCC): ICD-10-CM

## 2024-06-03 RX ORDER — TIRZEPATIDE 15 MG/.5ML
15 INJECTION, SOLUTION SUBCUTANEOUS
Qty: 2 ML | Refills: 3 | Status: SHIPPED | OUTPATIENT
Start: 2024-06-03

## 2024-06-03 RX ORDER — TIRZEPATIDE 2.5 MG/.5ML
2.5 INJECTION, SOLUTION SUBCUTANEOUS
Qty: 12 ML | Refills: 3 | Status: SHIPPED | OUTPATIENT
Start: 2024-06-03 | End: 2024-06-04

## 2024-06-03 RX ORDER — TIRZEPATIDE 2.5 MG/.5ML
2.5 INJECTION, SOLUTION SUBCUTANEOUS
Qty: 6 ML | Refills: 3 | Status: SHIPPED | OUTPATIENT
Start: 2024-06-03 | End: 2024-06-03

## 2024-06-04 DIAGNOSIS — E11.9 CONTROLLED TYPE 2 DIABETES MELLITUS WITHOUT COMPLICATION, WITHOUT LONG-TERM CURRENT USE OF INSULIN (HCC): ICD-10-CM

## 2024-06-04 DIAGNOSIS — E11.9 DIABETES MELLITUS TYPE II, CONTROLLED (HCC): ICD-10-CM

## 2024-06-04 RX ORDER — TIRZEPATIDE 2.5 MG/.5ML
INJECTION, SOLUTION SUBCUTANEOUS
Qty: 12 ML | Refills: 3 | Status: SHIPPED | OUTPATIENT
Start: 2024-06-04 | End: 2024-06-05

## 2024-06-04 NOTE — TELEPHONE ENCOUNTER
Received request via: Patient    Was the patient seen in the last year in this department? Yes    Does the patient have an active prescription (recently filled or refills available) for medication(s) requested? No    [facility]    Does the patient have care home Plus and need 100 day supply (blood pressure, diabetes and cholesterol meds only)? Patient does not have SCP

## 2024-06-05 RX ORDER — TIRZEPATIDE 12.5 MG/.5ML
INJECTION, SOLUTION SUBCUTANEOUS
Qty: 12 ML | Refills: 3 | Status: SHIPPED | OUTPATIENT
Start: 2024-06-05

## 2024-06-05 RX ORDER — TIRZEPATIDE 2.5 MG/.5ML
INJECTION, SOLUTION SUBCUTANEOUS
Qty: 12 ML | Refills: 3 | Status: SHIPPED | OUTPATIENT
Start: 2024-06-05

## 2024-07-31 DIAGNOSIS — E11.9 CONTROLLED TYPE 2 DIABETES MELLITUS WITHOUT COMPLICATION, WITHOUT LONG-TERM CURRENT USE OF INSULIN (HCC): ICD-10-CM

## 2024-08-01 RX ORDER — TADALAFIL 5 MG/1
5 TABLET ORAL DAILY
Qty: 90 TABLET | Refills: 0 | Status: SHIPPED | OUTPATIENT
Start: 2024-08-01

## 2024-08-01 NOTE — TELEPHONE ENCOUNTER
Received request via: Patient    Was the patient seen in the last year in this department? Yes    Does the patient have an active prescription (recently filled or refills available) for medication(s) requested? No    Pharmacy Name: Pharmacy:   [facility] - [address]     Does the patient have group home Plus and need 100 day supply (blood pressure, diabetes and cholesterol meds only)? Patient does not have SCP

## 2024-08-04 DIAGNOSIS — E78.2 MIXED HYPERLIPIDEMIA: ICD-10-CM

## 2024-08-05 RX ORDER — GEMFIBROZIL 600 MG/1
600 TABLET, FILM COATED ORAL 2 TIMES DAILY
Qty: 180 TABLET | Refills: 3 | Status: SHIPPED | OUTPATIENT
Start: 2024-08-05

## 2024-08-05 NOTE — TELEPHONE ENCOUNTER
Received request via: Patient    Was the patient seen in the last year in this department? Yes    Does the patient have an active prescription (recently filled or refills available) for medication(s) requested? No    [facility]    Does the patient have FDC Plus and need 100 day supply (blood pressure, diabetes and cholesterol meds only)? Patient does not have SCP

## 2024-12-17 DIAGNOSIS — E11.9 CONTROLLED TYPE 2 DIABETES MELLITUS WITHOUT COMPLICATION, WITHOUT LONG-TERM CURRENT USE OF INSULIN (HCC): ICD-10-CM

## 2024-12-18 RX ORDER — TADALAFIL 5 MG/1
5 TABLET ORAL DAILY
Qty: 90 TABLET | Refills: 0 | Status: SHIPPED | OUTPATIENT
Start: 2024-12-18

## 2024-12-18 NOTE — TELEPHONE ENCOUNTER
Received request via: Patient    Was the patient seen in the last year in this department? No    Does the patient have an active prescription (recently filled or refills available) for medication(s) requested? No    Pharmacy Name:  [id number]  [place], [place] - [address] [[id number]]     Does the patient have long-term Plus and need 100-day supply? (This applies to ALL medications) Patient does not have SCP

## 2025-01-18 ENCOUNTER — OFFICE VISIT (OUTPATIENT)
Dept: URGENT CARE | Facility: CLINIC | Age: 45
End: 2025-01-18
Payer: COMMERCIAL

## 2025-01-18 VITALS
TEMPERATURE: 98.7 F | HEART RATE: 96 BPM | HEIGHT: 67 IN | BODY MASS INDEX: 33.59 KG/M2 | OXYGEN SATURATION: 98 % | WEIGHT: 214 LBS | DIASTOLIC BLOOD PRESSURE: 80 MMHG | SYSTOLIC BLOOD PRESSURE: 120 MMHG | RESPIRATION RATE: 16 BRPM

## 2025-01-18 DIAGNOSIS — J10.1 INFLUENZA A: ICD-10-CM

## 2025-01-18 DIAGNOSIS — R05.1 ACUTE COUGH: ICD-10-CM

## 2025-01-18 DIAGNOSIS — J02.9 SORE THROAT: ICD-10-CM

## 2025-01-18 LAB
FLUAV RNA SPEC QL NAA+PROBE: POSITIVE
FLUBV RNA SPEC QL NAA+PROBE: NEGATIVE
RSV RNA SPEC QL NAA+PROBE: NEGATIVE
S PYO DNA SPEC NAA+PROBE: NOT DETECTED
SARS-COV-2 RNA RESP QL NAA+PROBE: NEGATIVE

## 2025-01-18 PROCEDURE — 3074F SYST BP LT 130 MM HG: CPT | Performed by: PHYSICIAN ASSISTANT

## 2025-01-18 PROCEDURE — 99213 OFFICE O/P EST LOW 20 MIN: CPT | Performed by: PHYSICIAN ASSISTANT

## 2025-01-18 PROCEDURE — 3079F DIAST BP 80-89 MM HG: CPT | Performed by: PHYSICIAN ASSISTANT

## 2025-01-18 PROCEDURE — 0241U POCT CEPHEID COV-2, FLU A/B, RSV - PCR: CPT | Performed by: PHYSICIAN ASSISTANT

## 2025-01-18 PROCEDURE — 87651 STREP A DNA AMP PROBE: CPT | Performed by: PHYSICIAN ASSISTANT

## 2025-01-18 RX ORDER — OSELTAMIVIR PHOSPHATE 75 MG/1
75 CAPSULE ORAL 2 TIMES DAILY
Qty: 10 CAPSULE | Refills: 0 | Status: SHIPPED | OUTPATIENT
Start: 2025-01-18

## 2025-01-18 RX ORDER — BENZONATATE 100 MG/1
100 CAPSULE ORAL 3 TIMES DAILY PRN
Qty: 20 CAPSULE | Refills: 0 | Status: SHIPPED | OUTPATIENT
Start: 2025-01-18

## 2025-01-18 NOTE — PROGRESS NOTES
Subjective:     Verbal consent was acquired by the patient to use [employer] ambient listening note generation during this visit     Yannick López is a 44 y.o. male who presents for Sinus Problem and Cough (Since wednesday)         History of Present Illness  The patient presents for evaluation of a sore throat, cough, and epistaxis.    He has been experiencing symptoms since Wednesday, initially presenting as sinus pressure and headaches, which have since evolved into a severe sore throat. He reports intermittent fevers, with a peak temperature of 100 degrees, accompanied by sweating and body aches. He also experiences shortness of breath during coughing episodes, which are described as painful. He does not report any wheezing but notes that his cough is most severe upon awakening and improves throughout the day. He has no history of respiratory issues or bronchitis. He has tested negative for COVID-19 at home. His current medications include Sudafed, Leticia-McFarlan, and cough drops.    He experienced three episodes of nosebleeds yesterday, triggered by coughing.    He has diabetes, which is well-managed with metformin, and he has lost approximately 60 pounds.    MEDICATIONS  Current: Metformin, Sudafed, Leticia-McFarlan          Medications:  anastrozole Tabs  atorvastatin Tabs  ciprofloxacin Soln  finasteride  fluticasone  FreeStyle Neli 14 Day Floral Kimberli  FreeStyle Neli 14 Day Sensor Misc  gabapentin Caps  gemfibrozil Tabs  ibuprofen Tabs  KRILL OIL PO  LISINOPRIL PO  lisinopril-hydrochlorothiazide  metformin  methylPREDNISolone Tbpk  minoxidil  Mounjaro Sopn  MULTIVITAMIN ADULTS PO  NON SPECIFIED  ofloxacin Soln  OMEPRAZOLE PO  pioglitazone Tabs  sildenafil citrate  tadalafil  Tirzepatide Sopn    Allergies:             Patient has no known allergies.    Past Social Hx:  Yannick López  reports that he has never smoked. He quit smokeless tobacco use about 9 years ago.  His smokeless tobacco use  "included chew. He reports current alcohol use. He reports that he does not use drugs.           Problem list, medications, and allergies reviewed by myself today in Epic.     Objective:     /80 (BP Location: Left arm, Patient Position: Sitting, BP Cuff Size: Large adult)   Pulse 96   Temp 37.1 °C (98.7 °F) (Temporal)   Resp 16   Ht 1.702 m (5' 7\")   Wt 97.1 kg (214 lb)   SpO2 98%   BMI 33.52 kg/m²     Physical Exam  Vitals and nursing note reviewed.   Constitutional:       General: He is not in acute distress.     Appearance: Normal appearance. He is well-developed. He is not ill-appearing or diaphoretic.   HENT:      Head: Normocephalic.      Right Ear: Tympanic membrane, ear canal and external ear normal.      Left Ear: Tympanic membrane, ear canal and external ear normal.      Nose: Mucosal edema, congestion and rhinorrhea present.      Mouth/Throat:      Palate: No mass.      Pharynx: Posterior oropharyngeal erythema present. No pharyngeal swelling, oropharyngeal exudate or uvula swelling.      Tonsils: No tonsillar exudate or tonsillar abscesses. 1+ on the right. 1+ on the left.   Eyes:      General:         Right eye: No discharge.         Left eye: No discharge.      Conjunctiva/sclera: Conjunctivae normal.      Pupils: Pupils are equal, round, and reactive to light.   Cardiovascular:      Rate and Rhythm: Normal rate and regular rhythm.      Pulses: Normal pulses.      Heart sounds: Normal heart sounds. No murmur heard.     No friction rub.   Pulmonary:      Effort: Pulmonary effort is normal. No tachypnea, accessory muscle usage or respiratory distress.      Breath sounds: Normal breath sounds. No stridor. No decreased breath sounds, wheezing, rhonchi or rales.      Comments: Lungs are clear to auscultation bilaterally without rhonchi rales or wheezes  Abdominal:      Palpations: Abdomen is soft.   Musculoskeletal:         General: Normal range of motion.      Cervical back: Normal range of " motion and neck supple.      Right lower leg: No edema.      Left lower leg: No edema.   Lymphadenopathy:      Cervical: No cervical adenopathy.   Skin:     General: Skin is warm and dry.   Neurological:      Mental Status: He is alert and oriented to person, place, and time.   Psychiatric:         Behavior: Behavior is cooperative.           Results for orders placed or performed in visit on 01/18/25   POCT CEPHEID GROUP A STREP - PCR    Collection Time: 01/18/25 11:44 AM   Result Value Ref Range    POC Group A Strep, PCR Not Detected Not Detected, Invalid   POCT CEPHEID COV-2, FLU A/B, RSV - PCR    Collection Time: 01/18/25 12:32 PM   Result Value Ref Range    SARS-CoV-2 by PCR Negative Negative, Invalid    Influenza virus A RNA Positive (A) Negative, Invalid    Influenza virus B, PCR Negative Negative, Invalid    RSV, PCR Negative Negative, Invalid           Assessment/Plan:     Diagnosis and Associated Orders:     1. Sore throat  - POCT CEPHEID GROUP A STREP - PCR    2. Acute cough  - POCT CEPHEID COV-2, FLU A/B, RSV - PCR  - benzonatate (TESSALON) 100 MG Cap; Take 1 Capsule by mouth 3 times a day as needed for Cough.  Dispense: 20 Capsule; Refill: 0    3. Influenza A        Medical Decision Making:    Pleasant 44 y.o. presents to clinic with:    Assessment & Plan  Discussed viral etiology of Influenza.     POCT + Influenza A  No evidence of lower respiratory involvement on exam today.    Tamiflu sent to patient's preferred pharmacy if requested by patient and an appropriate 48 to 72-hour timeframe  Patient educated on risks verus benefits of taking this medication  Overall, suspicions for pneumonia are low.  Therefore, antibiotics are not indicated at this time.  Discussed associated complications, including risk of pneumonia and ear infections.     Recommend symptomatic care:    OTC second generation antihistamine daily (cetirizine, desloratadine, fexofenadine, levocetirizine, and loratadine) daily IN  COMBINATION WITH:  OTC decongestant (Sudafed - Pseudoephedrine) unless contraindication in place, such as hypertension, CAD, narrow-angle glaucoma. Use with caution if the patient has a history of cardiac dysrhythmias, hyperthyroidism, DM, prostatic hypertrophy, and glaucoma should use with caution.  Intranasal fluticasone (Flonase) daily    Nasal saline rinses 2-3 times a day   May use short term nasal sprays, such as oxymetazoline (Afrin) to help relieve nasal discomfort, congestion, and/or pressure. Decongestant sprays should not be used longer than three consecutive days.   Nasal rinsing with saline nasal spray or salt water (e.g., neti pot) can help relieve nasal dryness.  Breathe Right nasal strips at night for nasal congestion,  Ponaris nasal emmollient for nasal congestion, dryness, and inflammation (do not use with iodine sensitivity)  Cool mist humidification, chest rubs, warm tea with honey, increased fluid intake to thin secretions  Tylenol or ibuprofen as needed for fever control, body aches, and headaches.    If sore throat is present:   Warm salt water gargles, over-the-counter throat sprays, rest, hydration with frozen (eg, ice or popsicles) or warmed liquids, herbal tea containing licorice root, elm inner bark, marshmallow root, and licorice root aqueous dry extract, Cepacol lozenges, soft diet, honey, vitamin C, zinc lozenges, and elderberry supplements.    If symptoms fail to improve within 72 hours, new symptoms develop, symptoms worsen return to clinic or see PCP for re-evaluation.     Remain home from work, school, and other populated environments until at least 24 hours after you no longer have a fever.     Discussed associated complications, including risk of pneumonia and ear infections. Follow up with primary care provider. Follow up urgently for worsening symptoms, ear pain or drainage, shortness of breath, abdominal pain, or any other concerns. Follow up emergently for trouble breathing,  elevated heart rate, chest pain, signs of dehydration, dizziness, weakness, decreased urine output, confusion, persistent vomiting, severe headache, neck stiffness, persistent high grade fever.      2. Epistaxis.  The patient reports experiencing a bloody nose three times since yesterday, likely due to dry air and forceful coughing. Saline or Flonase can be used to help with nasal irritation.    3. Diabetes Mellitus.  The patient reports that his diabetes is well-controlled since starting a new gel medication. He is still taking metformin but anticipates discussing discontinuation with his primary care physician in a few weeks. He has lost about 60 pounds, which has positively impacted his diabetes management.    I personally reviewed prior external notes and test results pertinent to today's visit. Patient is clinically stable at today's urgent care visit.  Patient appears nontoxic with no acute distress noted. Appropriate for outpatient care at this time.  Return to clinic or go to ED if symptoms worsen or persist.  Red flag symptoms discussed.  Patient/Parent/Guardian voices understanding.   All side effects of medication discussed including allergic response, GI upset, tendon injury, rash, sedation etc    Please note that this dictation was created using voice recognition software. I have made a reasonable attempt to correct obvious errors, but I expect that there are errors of grammar and possibly content that I did not discover before finalizing the note.    This note was electronically signed by Rosie Patton PA-C

## 2025-04-01 DIAGNOSIS — E11.9 CONTROLLED TYPE 2 DIABETES MELLITUS WITHOUT COMPLICATION, WITHOUT LONG-TERM CURRENT USE OF INSULIN (HCC): ICD-10-CM

## 2025-04-01 LAB — HBA1C MFR BLD: 5.6 % (ref ?–5.8)

## 2025-04-01 RX ORDER — TADALAFIL 5 MG/1
5 TABLET ORAL DAILY
Qty: 90 TABLET | Refills: 0 | Status: SHIPPED | OUTPATIENT
Start: 2025-04-01

## 2025-04-01 NOTE — TELEPHONE ENCOUNTER
Received request via: Patient    Was the patient seen in the last year in this department? No    Does the patient have an active prescription (recently filled or refills available) for medication(s) requested? No    [facility]     Does the patient have longterm Plus and need 100-day supply? (This applies to ALL medications) Patient does not have SCP

## 2025-04-14 ENCOUNTER — OFFICE VISIT (OUTPATIENT)
Dept: MEDICAL GROUP | Facility: PHYSICIAN GROUP | Age: 45
End: 2025-04-14
Payer: COMMERCIAL

## 2025-04-14 VITALS
DIASTOLIC BLOOD PRESSURE: 82 MMHG | WEIGHT: 203 LBS | HEART RATE: 97 BPM | HEIGHT: 67 IN | SYSTOLIC BLOOD PRESSURE: 118 MMHG | RESPIRATION RATE: 16 BRPM | OXYGEN SATURATION: 97 % | BODY MASS INDEX: 31.86 KG/M2 | TEMPERATURE: 98 F

## 2025-04-14 DIAGNOSIS — I10 ESSENTIAL HYPERTENSION: ICD-10-CM

## 2025-04-14 DIAGNOSIS — E11.9 CONTROLLED TYPE 2 DIABETES MELLITUS WITHOUT COMPLICATION, WITHOUT LONG-TERM CURRENT USE OF INSULIN (HCC): ICD-10-CM

## 2025-04-14 DIAGNOSIS — Z12.11 SCREENING FOR COLORECTAL CANCER: ICD-10-CM

## 2025-04-14 DIAGNOSIS — E78.2 MIXED HYPERLIPIDEMIA: ICD-10-CM

## 2025-04-14 DIAGNOSIS — Z12.12 SCREENING FOR COLORECTAL CANCER: ICD-10-CM

## 2025-04-14 PROCEDURE — 99214 OFFICE O/P EST MOD 30 MIN: CPT | Performed by: FAMILY MEDICINE

## 2025-04-14 PROCEDURE — 3079F DIAST BP 80-89 MM HG: CPT | Performed by: FAMILY MEDICINE

## 2025-04-14 PROCEDURE — 3074F SYST BP LT 130 MM HG: CPT | Performed by: FAMILY MEDICINE

## 2025-04-14 ASSESSMENT — ENCOUNTER SYMPTOMS
MYALGIAS: 0
NAUSEA: 0
TINGLING: 0
MUSCULOSKELETAL NEGATIVE: 1
HEARTBURN: 0
BLURRED VISION: 0
COUGH: 0
CARDIOVASCULAR NEGATIVE: 1
HEADACHES: 0
PSYCHIATRIC NEGATIVE: 1
HEMOPTYSIS: 0
DIZZINESS: 0
PALPITATIONS: 0
BRUISES/BLEEDS EASILY: 0
NEUROLOGICAL NEGATIVE: 1
CHILLS: 0
CONSTITUTIONAL NEGATIVE: 1
DEPRESSION: 0
GASTROINTESTINAL NEGATIVE: 1
EYES NEGATIVE: 1
DOUBLE VISION: 0
RESPIRATORY NEGATIVE: 1
FEVER: 0

## 2025-04-14 ASSESSMENT — PATIENT HEALTH QUESTIONNAIRE - PHQ9: CLINICAL INTERPRETATION OF PHQ2 SCORE: 0

## 2025-04-14 NOTE — PROGRESS NOTES
Subjective     Andrea López is a 44 y.o. male who presents with Lab Results            1. Controlled type 2 diabetes mellitus without complication, without long-term current use of insulin (HCC)  Currently treated for DM, taking meds and checking bs at home, trying to do DM diet.controlled    A1c down to 5.8    2. Mixed hyperlipidemia  Currently treated for HLD, taking meds with no new myalgias or joint pain, watching fats in diet  controlled        3. Essential hypertension  Currently treated for HTN, taking meds with no CP or sob, monitors bp at home periodically. controlled        Past Medical History:  2018: Bronchitis  No date: Dental disorder      Comment:  caps  No date: Diabetes (HCC)      Comment:  7/26/21-Avg AM glucose=140, but goes down during the                day.   No date: GERD (gastroesophageal reflux disease)      Comment:  OTC prilosec PRN  No date: Heart burn      Comment:  OTC prilosec prn  No date: High cholesterol  No date: Hyperlipidemia  07/2021: Infectious disease      Comment:  Covid  07/26/2021: Left foot pain      Comment:  healing S/P surgery  07/26/2021: Right wrist pain      Comment:  and hand  No date: Sleep apnea      Comment:  CPAP  No date: Snoring  Past Surgical History:  9/21/2021: PB INCIS TENDON SHEATH,RADIAL STYLOID; Right      Comment:  Procedure: RELEASE, HAND, FOR DEQUERVAIN'S                TENOSYNOVITIS;  Surgeon: Say Davis M.D.;  Location:                SURGERY [facility];  Service: Orthopedics  05/2021: ORIF, FOOT; Left  2000: RECONSTRUCTION, KNEE, ACL; Left  Social History    Tobacco Use      Smoking status: Never      Smokeless tobacco: Former        Types: Chew        Quit date: 2016    Vaping Use      Vaping status: Never Used    Alcohol use: Yes      Comment: Maybe 1-2 drinks per week-beer or liquor    Drug use: Never    Review of patient's family history indicates:  Problem: Heart Disease      Relation: Father          Age of Onset: (Not  Specified)  Problem: Hyperlipidemia      Relation: Father          Age of Onset: (Not Specified)      Current Outpatient Medications: ·  tadalafil (CIALIS) 5 MG tablet, Take 1 Tablet by mouth every day., Disp: 90 Tablet, Rfl: 0·  benzonatate (TESSALON) 100 MG Cap, Take 1 Capsule by mouth 3 times a day as needed for Cough., Disp: 20 Capsule, Rfl: 0·  oseltamivir (TAMIFLU) 75 MG Cap, Take 1 Capsule by mouth 2 times a day., Disp: 10 Capsule, Rfl: 0·  gemfibrozil (LOPID) 600 MG Tab, TAKE 1 TABLET TWICE A DAY, Disp: 180 Tablet, Rfl: 3·  MOUNJARO 2.5 MG/0.5ML Solution Pen-injector, INJECT 2.5 MG UNDER THE SKIN ONCE A WEEK, Disp: 12 mL, Rfl: 3·  Tirzepatide (MOUNJARO) 12.5 MG/0.5ML Solution Pen-injector, INJECT 12.5 MG BY SUBCUTANEOUS ROUTE EVERY 7 DAYS, Disp: 12 mL, Rfl: 3·  Tirzepatide (MOUNJARO) 15 MG/0.5ML Solution Pen-injector, Inject 15 mg under the skin every 7 days., Disp: 2 mL, Rfl: 3·  anastrozole (ARIMIDEX) 1 MG Tab, Take 1 Tablet by mouth every day., Disp: 30 Tablet, Rfl: 3·  metformin (GLUCOPHAGE) 1000 MG tablet, Take 1 Tablet by mouth 2 times a day., Disp: 180 Tablet, Rfl: 3·  Tirzepatide 10 MG/0.5ML Solution Pen-injector, Inject 10 mg under the skin every 7 days., Disp: 6 mL, Rfl: 3·  atorvastatin (LIPITOR) 10 MG Tab, Take 1 Tablet by mouth every day. (Patient not taking: Reported on 10/2/2023), Disp: , Rfl: ·  metformin (GLUCOPHAGE) 1000 MG tablet, Take 1 Tablet by mouth 2 times a day. (Patient not taking: Reported on 10/2/2023), Disp: , Rfl: ·  sildenafil citrate (VIAGRA) 100 MG tablet, Take 1 Tablet by mouth 1 time a day as needed. (Patient not taking: Reported on 10/2/2023), Disp: , Rfl: ·  Tirzepatide 7.5 MG/0.5ML Solution Pen-injector, Inject 7.5 mg under the skin every 7 days., Disp: 7.5 mL, Rfl: 3·  Tirzepatide 5 MG/0.5ML Solution Pen-injector, Inject 5 mg under the skin every 7 days. (Patient not taking: Reported on 10/2/2023), Disp: 3 mL, Rfl: 1·  atorvastatin (LIPITOR) 10 MG Tab, 1 tab(s) (Patient  not taking: Reported on 5/23/2023), Disp: , Rfl: ·  tadalafil (CIALIS) 10 MG tablet, tadalafil 10 mg tablet (Patient not taking: Reported on 10/2/2023), Disp: , Rfl: ·  OMEPRAZOLE PO, 1 cap(s) (Patient not taking: Reported on 5/23/2023), Disp: , Rfl: ·  LISINOPRIL PO, , Disp: , Rfl: ·  Continuous Blood Gluc Sensor (FREESTYLE JENNIFER 14 DAY SENSOR) Misc, FreeStyle Jennifer 14 Day Sensor kit (Patient not taking: Reported on 5/23/2023), Disp: , Rfl: ·  Continuous Blood Gluc  (FREESTYLE JENNIFER 14 DAY READER) Device, FreeStyle Jennifer 14 Day Aurora (Patient not taking: Reported on 10/2/2023), Disp: , Rfl: ·  fluticasone (FLONASE) 50 MCG/ACT nasal spray, 1 spray(s) (Patient not taking: Reported on 5/23/2023), Disp: , Rfl: ·  metformin (GLUCOPHAGE) 1000 MG tablet, Take 1 Tablet by mouth 2 times a day. (Patient not taking: Reported on 10/2/2023), Disp: 180 Tablet, Rfl: 3·  Continuous Blood Gluc Sensor (FREESTYLE JENNIFER 14 DAY SENSOR) Misc, 1 Each by Other route every 14 days., Disp: 6 Each, Rfl: 3·  atorvastatin (LIPITOR) 10 MG Tab, Take 1 Tablet by mouth every day. (Patient not taking: Reported on 5/23/2023), Disp: , Rfl: ·  ciprofloxacin (CILOXIN) 0.3 % Solution, APPLY 2 DROP EVERY 4 HOURS FOR 7 DAYS (Patient not taking: Reported on 4/25/2023), Disp: , Rfl: ·  gabapentin (NEURONTIN) 100 MG Cap, gabapentin 100 mg capsule (Patient not taking: Reported on 4/25/2023), Disp: , Rfl: ·  lisinopril-hydrochlorothiazide (PRINZIDE) 10-12.5 MG per tablet, 1 tab(s) (Patient not taking: Reported on 4/25/2023), Disp: , Rfl: ·  metformin (GLUCOPHAGE) 1000 MG tablet, , Disp: , Rfl: ·  methylPREDNISolone (MEDROL DOSEPAK) 4 MG Tablet Therapy Pack, TAKE 6 TABLETS ON DAY 1 AS DIRECTED ON PACKAGE AND DECREASE BY 1 TAB EACH DAY FOR A TOTAL OF 6 DAYS (Patient not taking: Reported on 4/25/2023), Disp: , Rfl: ·  ofloxacin (OCUFLOX) 0.3 % Solution, INSTILL 1 DROP INTO THE AFFECTED EYES EVERY 4 HOURS FOR 7 DAYS (Patient not taking: Reported on  4/25/2023), Disp: , Rfl: ·  sildenafil citrate (VIAGRA) 100 MG tablet, 1 tab(s) (Patient not taking: Reported on 4/25/2023), Disp: , Rfl: ·  tadalafil (CIALIS) 20 MG tablet, TAKE 1 TABLET BY MOUTH 30 MINUTES PRIOR TO INTERCOURSE, DO NOT EXCEED 1 TABLET IN 24 HOURS (Patient not taking: Reported on 5/23/2023), Disp: , Rfl: ·  Continuous Blood Gluc  (FREESTYLE JENNIFER 14 DAY READER) Device, , Disp: , Rfl: ·  NON SPECIFIED, Continuous glucose monitor (Patient not taking: Reported on 4/25/2023), Disp: 1 Each, Rfl: 3·  pioglitazone (ACTOS) 45 MG Tab, Take 1 Tablet by mouth every day. (Patient not taking: Reported on 4/25/2023), Disp: 90 Tablet, Rfl: 3·  Multiple Vitamins-Minerals (MULTIVITAMIN ADULTS PO), Take 1 Tablet by mouth every day., Disp: , Rfl: ·  KRILL OIL PO, Take 1,200 mg by mouth every day., Disp: , Rfl: ·  finasteride (PROPECIA) 1 MG tablet, Take 1 mg by mouth every day. (Patient not taking: Reported on 4/25/2023), Disp: , Rfl: ·  minoxidil (ROGAINE) 2 % external solution, Apply 1 mL topically 2 times a day. (Patient not taking: Reported on 4/25/2023), Disp: , Rfl: ·  ibuprofen (MOTRIN) 200 MG Tab, Take 200 mg by mouth every 6 hours as needed., Disp: , Rfl:     Patient was instructed on the use of medications, either prescriptions or OTC and informed on when the appropriate follow up time period should be. In addition, patient was also instructed that should any acute worsening occur that they should notify this clinic asap or call 911.              Review of Systems   Constitutional: Negative.  Negative for chills and fever.   HENT: Negative.  Negative for hearing loss.    Eyes: Negative.  Negative for blurred vision and double vision.   Respiratory: Negative.  Negative for cough and hemoptysis.    Cardiovascular: Negative.  Negative for chest pain and palpitations.   Gastrointestinal: Negative.  Negative for heartburn and nausea.   Genitourinary: Negative.  Negative for dysuria.   Musculoskeletal:  "Negative.  Negative for myalgias.   Skin: Negative.  Negative for rash.   Neurological: Negative.  Negative for dizziness, tingling and headaches.   Endo/Heme/Allergies: Negative.  Does not bruise/bleed easily.   Psychiatric/Behavioral: Negative.  Negative for depression and suicidal ideas.    All other systems reviewed and are negative.             Objective     /82   Pulse 97   Temp 36.7 °C (98 °F) (Temporal)   Resp 16   Ht 1.702 m (5' 7\")   Wt 92.1 kg (203 lb)   SpO2 97%   BMI 31.79 kg/m²      Physical Exam  Vitals and nursing note reviewed.   Constitutional:       General: He is not in acute distress.     Appearance: He is well-developed. He is not diaphoretic.   HENT:      Head: Normocephalic and atraumatic.      Mouth/Throat:      Pharynx: No oropharyngeal exudate.   Eyes:      Pupils: Pupils are equal, round, and reactive to light.   Cardiovascular:      Rate and Rhythm: Normal rate and regular rhythm.      Heart sounds: Normal heart sounds. No murmur heard.     No friction rub. No gallop.   Pulmonary:      Effort: Pulmonary effort is normal. No respiratory distress.      Breath sounds: Normal breath sounds. No wheezing or rales.   Chest:      Chest wall: No tenderness.   Neurological:      Mental Status: He is alert and oriented to person, place, and time.   Psychiatric:         Behavior: Behavior normal.         Thought Content: Thought content normal.         Judgment: Judgment normal.                                  Assessment & Plan  Controlled type 2 diabetes mellitus without complication, without long-term current use of insulin (HCC)         Mixed hyperlipidemia         Essential hypertension                      "

## 2025-04-18 NOTE — Clinical Note
REFERRAL APPROVAL NOTICE         Sent on April 18, 2025                   Andrea López  [address] [place] [address]                   Dear Mr. López,    After a careful review of the medical information and benefit coverage, [facility] has processed your referral. See below for additional details.    If applicable, you must be actively enrolled with your insurance for coverage of the authorized service. If you have any questions regarding your coverage, please contact your insurance directly.    REFERRAL INFORMATION   Referral #:  [id number]  Referred-To Provider    Referred-By Provider:  Ophthalmology    Murali Levine M.D.   [facility]      [address] [facility] [address]  [facility] [address]  [phone] [id number]  [place] [address]  [phone]    Referral Start Date:  04/14/2025  Referral End Date:   04/14/2026             SCHEDULING  If you do not already have an appointment, please call [phone] to make an appointment.     MORE INFORMATION  If you do not already have a [facility] account, sign up at: [email or web address]  You can access your medical information, make appointments, see lab results, billing information, and more.  If you have questions regarding this referral, please contact  the [facility] Referrals department at:             [phone]. Monday - Friday 8:00AM - 5:00PM.     Sincerely,    [facility]

## 2025-05-06 DIAGNOSIS — E11.9 CONTROLLED TYPE 2 DIABETES MELLITUS WITHOUT COMPLICATION, WITHOUT LONG-TERM CURRENT USE OF INSULIN (HCC): ICD-10-CM

## 2025-05-06 NOTE — TELEPHONE ENCOUNTER
Received request via: Pharmacy    Was the patient seen in the last year in this department? Yes    Does the patient have an active prescription (recently filled or refills available) for medication(s) requested? No    [facility]    Does the patient have intermediate Plus and need 100-day supply? (This applies to ALL medications) Patient does not have SCP

## 2025-05-26 LAB — NONINV COLON CA DNA+OCC BLD SCRN STL QL: NEGATIVE

## 2025-07-22 DIAGNOSIS — E11.9 CONTROLLED TYPE 2 DIABETES MELLITUS WITHOUT COMPLICATION, WITHOUT LONG-TERM CURRENT USE OF INSULIN (HCC): ICD-10-CM

## 2025-07-23 RX ORDER — TADALAFIL 5 MG/1
5 TABLET ORAL DAILY
Qty: 90 TABLET | Refills: 0 | Status: SHIPPED | OUTPATIENT
Start: 2025-07-23

## 2025-07-23 NOTE — TELEPHONE ENCOUNTER
Received request via: Pharmacy    Was the patient seen in the last year in this department? Yes    Does the patient have an active prescription (recently filled or refills available) for medication(s) requested? No    Pharmacy Name: [facility] [place], [place] - [address]     Does the patient have [facility] Plus and need 100-day supply? (This applies to ALL medications) Patient does not have SCP

## 2025-08-07 ENCOUNTER — OFFICE VISIT (OUTPATIENT)
Dept: URGENT CARE | Facility: CLINIC | Age: 45
End: 2025-08-07
Payer: COMMERCIAL

## 2025-08-07 VITALS
TEMPERATURE: 99.6 F | SYSTOLIC BLOOD PRESSURE: 104 MMHG | HEIGHT: 67 IN | WEIGHT: 200 LBS | BODY MASS INDEX: 31.39 KG/M2 | RESPIRATION RATE: 17 BRPM | DIASTOLIC BLOOD PRESSURE: 82 MMHG | OXYGEN SATURATION: 98 % | HEART RATE: 93 BPM

## 2025-08-07 DIAGNOSIS — U07.1 COVID-19 VIRUS INFECTION: Primary | ICD-10-CM

## 2025-08-07 DIAGNOSIS — J22 ACUTE RESPIRATORY INFECTION: ICD-10-CM

## 2025-08-07 DIAGNOSIS — E11.9 CONTROLLED TYPE 2 DIABETES MELLITUS WITHOUT COMPLICATION, WITHOUT LONG-TERM CURRENT USE OF INSULIN (HCC): ICD-10-CM

## 2025-08-07 LAB
FLUAV RNA SPEC QL NAA+PROBE: NEGATIVE
FLUBV RNA SPEC QL NAA+PROBE: NEGATIVE
RSV RNA SPEC QL NAA+PROBE: NEGATIVE
SARS-COV-2 RNA RESP QL NAA+PROBE: POSITIVE

## 2025-08-07 PROCEDURE — 99214 OFFICE O/P EST MOD 30 MIN: CPT | Performed by: NURSE PRACTITIONER

## 2025-08-07 PROCEDURE — 3079F DIAST BP 80-89 MM HG: CPT | Performed by: NURSE PRACTITIONER

## 2025-08-07 PROCEDURE — 87637 SARSCOV2&INF A&B&RSV AMP PRB: CPT | Performed by: NURSE PRACTITIONER

## 2025-08-07 PROCEDURE — 3074F SYST BP LT 130 MM HG: CPT | Performed by: NURSE PRACTITIONER

## 2025-08-10 DIAGNOSIS — E11.9 CONTROLLED TYPE 2 DIABETES MELLITUS WITHOUT COMPLICATION, WITHOUT LONG-TERM CURRENT USE OF INSULIN (HCC): ICD-10-CM

## 2025-08-11 DIAGNOSIS — E11.9 CONTROLLED TYPE 2 DIABETES MELLITUS WITHOUT COMPLICATION, WITHOUT LONG-TERM CURRENT USE OF INSULIN (HCC): ICD-10-CM

## 2025-08-11 RX ORDER — TIRZEPATIDE 15 MG/.5ML
INJECTION, SOLUTION SUBCUTANEOUS
Qty: 12 ML | Refills: 0 | Status: SHIPPED | OUTPATIENT
Start: 2025-08-11

## 2025-08-25 DIAGNOSIS — E11.9 CONTROLLED TYPE 2 DIABETES MELLITUS WITHOUT COMPLICATION, WITHOUT LONG-TERM CURRENT USE OF INSULIN (HCC): ICD-10-CM

## (undated) DEVICE — WATER IRRIGATION STERILE 1000ML (12EA/CA)

## (undated) DEVICE — TOWEL STOP TIMEOUT SAFETY FLAG (40EA/CA)

## (undated) DEVICE — PACK UPPER EXTREMITY SM OR - (3/CA)

## (undated) DEVICE — STOCKINET BIAS 4 IN STERILE - (20/CA)

## (undated) DEVICE — SENSOR SPO2 NEO LNCS ADHESIVE (20/BX) SEE USER NOTES

## (undated) DEVICE — MASK ANESTHESIA ADULT  - (100/CA)

## (undated) DEVICE — GLOVE BIOGEL SZ 8 SURGICAL PF LTX - (50PR/BX 4BX/CA)

## (undated) DEVICE — HUMID-VENT HEAT AND MOISTURE EXCHANGE- (50/BX)

## (undated) DEVICE — PROTECTOR ULNA NERVE - (36PR/CA)

## (undated) DEVICE — TUBE CONNECTING SUCTION - CLEAR PLASTIC STERILE 72 IN (50EA/CA)

## (undated) DEVICE — SODIUM CHL IRRIGATION 0.9% 1000ML (12EA/CA)

## (undated) DEVICE — CHLORAPREP 26 ML APPLICATOR - ORANGE TINT(25/CA)

## (undated) DEVICE — SUCTION INSTRUMENT YANKAUER BULBOUS TIP W/O VENT (50EA/CA)

## (undated) DEVICE — ELECTRODE DUAL RETURN W/ CORD - (50/PK)

## (undated) DEVICE — GOWN WARMING STANDARD FLEX - (30/CA)

## (undated) DEVICE — PADDING CAST 4 IN STERILE - 4 X 4 YDS (24/CA)

## (undated) DEVICE — ELECTRODE 850 FOAM ADHESIVE - HYDROGEL RADIOTRNSPRNT (50/PK)

## (undated) DEVICE — KIT ANESTHESIA W/CIRCUIT & 3/LT BAG W/FILTER (20EA/CA)

## (undated) DEVICE — GLOVE, LITE (PAIR)

## (undated) DEVICE — GLOVE BIOGEL INDICATOR SZ 8 SURGICAL PF LTX - (50/BX 4BX/CA)

## (undated) DEVICE — HEAD HOLDER JUNIOR/ADULT

## (undated) DEVICE — CANISTER SUCTION RIGID RED 1500CC (40EA/CA)

## (undated) DEVICE — LACTATED RINGERS INJ 1000 ML - (14EA/CA 60CA/PF)

## (undated) DEVICE — BAG SPONGE COUNT 10.25 X 32 - BLUE (250/CA)

## (undated) DEVICE — SUTURE 4-0 ETHILON PS-2 18 (12PK/BX)"

## (undated) DEVICE — NEPTUNE 4 PORT MANIFOLD - (20/PK)